# Patient Record
Sex: MALE | Race: WHITE | NOT HISPANIC OR LATINO | URBAN - METROPOLITAN AREA
[De-identification: names, ages, dates, MRNs, and addresses within clinical notes are randomized per-mention and may not be internally consistent; named-entity substitution may affect disease eponyms.]

---

## 2023-09-22 ENCOUNTER — HOSPITAL ENCOUNTER (OUTPATIENT)
Dept: DATA CONVERSION | Facility: HOSPITAL | Age: 58
Discharge: HOME | End: 2023-09-22
Payer: COMMERCIAL

## 2023-09-22 DIAGNOSIS — R94.5 ABNORMAL RESULTS OF LIVER FUNCTION STUDIES: ICD-10-CM

## 2023-09-22 LAB
HBV SURFACE AB SER QL IA: NORMAL
HBV SURFACE AG SER QL: NEGATIVE

## 2023-09-23 LAB
HBV CORE AB SER QL: NORMAL
HCV AB SER QL: NORMAL
HEPATITIS A IGG -LH SQ DATA CONVERSION: NORMAL

## 2023-10-05 ENCOUNTER — HOSPITAL ENCOUNTER (OUTPATIENT)
Dept: RADIOLOGY | Facility: HOSPITAL | Age: 58
Discharge: HOME | End: 2023-10-05
Payer: COMMERCIAL

## 2023-10-05 DIAGNOSIS — R94.5 ABNORMAL RESULTS OF LIVER FUNCTION STUDIES: ICD-10-CM

## 2023-10-05 PROCEDURE — 76705 ECHO EXAM OF ABDOMEN: CPT

## 2023-10-12 ENCOUNTER — TELEPHONE (OUTPATIENT)
Dept: PRIMARY CARE | Facility: CLINIC | Age: 58
End: 2023-10-12
Payer: COMMERCIAL

## 2023-10-12 DIAGNOSIS — E11.9 TYPE 2 DIABETES MELLITUS WITHOUT COMPLICATION, WITHOUT LONG-TERM CURRENT USE OF INSULIN (MULTI): ICD-10-CM

## 2023-10-12 DIAGNOSIS — E11.9 ALTERED METABOLISM DUE TO DIABETES (MULTI): Primary | ICD-10-CM

## 2023-11-08 PROBLEM — N52.9 MALE ERECTILE DYSFUNCTION, UNSPECIFIED: Status: ACTIVE | Noted: 2023-11-08

## 2023-11-08 PROBLEM — G47.33 OSA (OBSTRUCTIVE SLEEP APNEA): Status: ACTIVE | Noted: 2023-11-08

## 2023-11-08 PROBLEM — I51.9 HEART DISEASE, UNSPECIFIED: Status: ACTIVE | Noted: 2023-11-08

## 2023-11-08 PROBLEM — E03.9 HYPOTHYROIDISM (ACQUIRED): Status: ACTIVE | Noted: 2023-11-08

## 2023-11-08 PROBLEM — H52.4 MYOPIA WITH ASTIGMATISM AND PRESBYOPIA: Status: ACTIVE | Noted: 2023-11-08

## 2023-11-08 PROBLEM — S99.929A INJURY OF TOE: Status: ACTIVE | Noted: 2023-11-08

## 2023-11-08 PROBLEM — R22.42 FOOT MASS, LEFT: Status: ACTIVE | Noted: 2023-11-08

## 2023-11-08 PROBLEM — L89.892: Status: ACTIVE | Noted: 2023-11-08

## 2023-11-08 PROBLEM — H52.10 MYOPIA WITH ASTIGMATISM AND PRESBYOPIA: Status: ACTIVE | Noted: 2023-11-08

## 2023-11-08 PROBLEM — I70.90 ATHEROSCLEROSIS: Status: ACTIVE | Noted: 2023-11-08

## 2023-11-08 PROBLEM — N40.0 BENIGN PROSTATIC HYPERPLASIA: Status: ACTIVE | Noted: 2023-11-08

## 2023-11-08 PROBLEM — L89.893: Status: ACTIVE | Noted: 2023-11-08

## 2023-11-08 PROBLEM — H40.009 GLAUCOMA SUSPECT WITH OPEN ANGLE: Status: ACTIVE | Noted: 2023-11-08

## 2023-11-08 PROBLEM — H52.209 MYOPIA WITH ASTIGMATISM AND PRESBYOPIA: Status: ACTIVE | Noted: 2023-11-08

## 2023-11-08 PROBLEM — E11.9 TYPE 2 DIABETES MELLITUS WITHOUT COMPLICATIONS (MULTI): Status: ACTIVE | Noted: 2023-11-08

## 2023-11-08 PROBLEM — I10 ESSENTIAL HYPERTENSION: Status: ACTIVE | Noted: 2023-11-08

## 2023-11-08 PROBLEM — E78.2 MIXED HYPERLIPIDEMIA: Status: ACTIVE | Noted: 2023-11-08

## 2023-11-08 RX ORDER — METFORMIN HYDROCHLORIDE 1000 MG/1
1 TABLET ORAL
COMMUNITY
Start: 2017-08-04

## 2023-11-08 RX ORDER — FUROSEMIDE 20 MG/1
1 TABLET ORAL DAILY
COMMUNITY
Start: 2018-01-29

## 2023-11-08 RX ORDER — LEVOTHYROXINE SODIUM 175 UG/1
175 TABLET ORAL
COMMUNITY
Start: 2017-07-18

## 2023-11-08 RX ORDER — SIMVASTATIN 20 MG/1
TABLET, FILM COATED ORAL
COMMUNITY
Start: 2017-07-05 | End: 2023-12-07 | Stop reason: WASHOUT

## 2023-11-08 RX ORDER — INSULIN ASPART 100 [IU]/ML
25-30 INJECTION, SOLUTION INTRAVENOUS; SUBCUTANEOUS 3 TIMES DAILY
COMMUNITY
End: 2023-12-07 | Stop reason: WASHOUT

## 2023-11-08 RX ORDER — LISINOPRIL AND HYDROCHLOROTHIAZIDE 12.5; 2 MG/1; MG/1
1 TABLET ORAL DAILY
COMMUNITY
Start: 2022-11-03 | End: 2023-12-07 | Stop reason: SDUPTHER

## 2023-11-08 RX ORDER — PEN NEEDLE, DIABETIC 30 GX3/16"
NEEDLE, DISPOSABLE MISCELLANEOUS
COMMUNITY

## 2023-11-08 RX ORDER — LANCETS 33 GAUGE
1 EACH MISCELLANEOUS
COMMUNITY
Start: 2018-12-04

## 2023-11-08 RX ORDER — PYRIDOSTIGMINE BROMIDE 60 MG/1
60 TABLET ORAL 3 TIMES DAILY
COMMUNITY
End: 2023-12-07 | Stop reason: WASHOUT

## 2023-11-08 RX ORDER — TADALAFIL 20 MG/1
20 TABLET ORAL DAILY
COMMUNITY
Start: 2017-07-26

## 2023-11-08 RX ORDER — PEN NEEDLE, DIABETIC 29 G X1/2"
NEEDLE, DISPOSABLE MISCELLANEOUS
COMMUNITY
Start: 2023-08-19

## 2023-11-08 RX ORDER — BLOOD SUGAR DIAGNOSTIC
STRIP MISCELLANEOUS
COMMUNITY
Start: 2023-05-15

## 2023-11-08 RX ORDER — TELMISARTAN AND HYDROCHLORTHIAZIDE 40; 12.5 MG/1; MG/1
TABLET ORAL
COMMUNITY
End: 2023-12-07 | Stop reason: WASHOUT

## 2023-11-08 RX ORDER — SULFAMETHOXAZOLE AND TRIMETHOPRIM 800; 160 MG/1; MG/1
TABLET ORAL
COMMUNITY
Start: 2018-01-23 | End: 2023-12-07 | Stop reason: WASHOUT

## 2023-11-08 RX ORDER — TELMISARTAN AND HYDROCHLORTHIAZIDE 80; 12.5 MG/1; MG/1
TABLET ORAL
COMMUNITY
Start: 2017-07-05 | End: 2023-12-07 | Stop reason: WASHOUT

## 2023-11-08 RX ORDER — INSULIN ASPART 100 [IU]/ML
25-30 INJECTION, SOLUTION INTRAVENOUS; SUBCUTANEOUS 3 TIMES DAILY
COMMUNITY

## 2023-11-08 RX ORDER — TERAZOSIN 1 MG/1
1 CAPSULE ORAL NIGHTLY
COMMUNITY
Start: 2017-07-05

## 2023-11-08 RX ORDER — VENLAFAXINE HYDROCHLORIDE 37.5 MG/1
37.5 CAPSULE, EXTENDED RELEASE ORAL DAILY
COMMUNITY

## 2023-11-08 RX ORDER — PEN NEEDLE, DIABETIC 29 G X1/2"
NEEDLE, DISPOSABLE MISCELLANEOUS
COMMUNITY
Start: 2023-07-23 | End: 2023-12-07 | Stop reason: SDUPTHER

## 2023-11-08 RX ORDER — AMLODIPINE BESYLATE 10 MG/1
1 TABLET ORAL DAILY
COMMUNITY
Start: 2017-07-05

## 2023-11-09 ENCOUNTER — APPOINTMENT (OUTPATIENT)
Dept: PRIMARY CARE | Facility: CLINIC | Age: 58
End: 2023-11-09
Payer: COMMERCIAL

## 2023-11-25 LAB — HEMOGLOBIN A1C/HEMOGLOBIN TOTAL IN BLOOD EXTERNAL: 6.2 %

## 2023-11-28 ENCOUNTER — APPOINTMENT (OUTPATIENT)
Dept: PRIMARY CARE | Facility: CLINIC | Age: 58
End: 2023-11-28
Payer: COMMERCIAL

## 2023-12-07 ENCOUNTER — OFFICE VISIT (OUTPATIENT)
Dept: PRIMARY CARE | Facility: CLINIC | Age: 58
End: 2023-12-07
Payer: COMMERCIAL

## 2023-12-07 VITALS
SYSTOLIC BLOOD PRESSURE: 138 MMHG | HEART RATE: 76 BPM | BODY MASS INDEX: 34.46 KG/M2 | OXYGEN SATURATION: 99 % | HEIGHT: 76 IN | DIASTOLIC BLOOD PRESSURE: 70 MMHG | WEIGHT: 283 LBS

## 2023-12-07 DIAGNOSIS — E11.9 ALTERED METABOLISM DUE TO DIABETES (MULTI): Primary | ICD-10-CM

## 2023-12-07 DIAGNOSIS — E16.2 HYPOGLYCEMIA: ICD-10-CM

## 2023-12-07 DIAGNOSIS — Z23 NEED FOR VACCINATION: ICD-10-CM

## 2023-12-07 PROCEDURE — 3075F SYST BP GE 130 - 139MM HG: CPT | Performed by: FAMILY MEDICINE

## 2023-12-07 PROCEDURE — 1036F TOBACCO NON-USER: CPT | Performed by: FAMILY MEDICINE

## 2023-12-07 PROCEDURE — 90744 HEPB VACC 3 DOSE PED/ADOL IM: CPT | Performed by: FAMILY MEDICINE

## 2023-12-07 PROCEDURE — 3078F DIAST BP <80 MM HG: CPT | Performed by: FAMILY MEDICINE

## 2023-12-07 PROCEDURE — 99213 OFFICE O/P EST LOW 20 MIN: CPT | Performed by: FAMILY MEDICINE

## 2023-12-07 PROCEDURE — 3044F HG A1C LEVEL LT 7.0%: CPT | Performed by: FAMILY MEDICINE

## 2023-12-07 RX ORDER — PEN NEEDLE, DIABETIC 29 G X1/2"
NEEDLE, DISPOSABLE MISCELLANEOUS
Qty: 300 EACH | Refills: 3 | Status: SHIPPED | OUTPATIENT
Start: 2023-12-07

## 2023-12-07 RX ORDER — LISINOPRIL AND HYDROCHLOROTHIAZIDE 12.5; 2 MG/1; MG/1
1 TABLET ORAL DAILY
Qty: 90 TABLET | Refills: 3 | Status: SHIPPED | OUTPATIENT
Start: 2023-12-07 | End: 2024-04-01 | Stop reason: SDUPTHER

## 2023-12-07 ASSESSMENT — PAIN SCALES - GENERAL: PAINLEVEL: 0-NO PAIN

## 2023-12-07 ASSESSMENT — ENCOUNTER SYMPTOMS
POLYDIPSIA: 0
CHEST TIGHTNESS: 1

## 2023-12-07 NOTE — PROGRESS NOTES
"[facility]: [facility]  E/M EVALUATION    Leoncio Benitez is a 58 y.o. male who presents for Follow-up and Med Refill (Patient needs medication refills/dmw).    Subjective   DM- controlled.  Hba1c 6.2 10/23 [facility].  He was having lows in the 50s and had to decrease insulin for a while and has since increased back to his original.      Thyroid- needs to make sure its generic.      Had muscle biopsy, denervation noted. On effexor now.         Review of Systems   Respiratory:  Positive for chest tightness.    Cardiovascular:  Positive for chest pain.   Endocrine: Negative for polydipsia and polyuria.       Objective   Vitals:    12/07/23 1328   BP: 138/70   Pulse: 76   SpO2: 99%     Physical Exam  Constitutional:       Appearance: Normal appearance.   Cardiovascular:      Rate and Rhythm: Normal rate and regular rhythm.      Heart sounds: No murmur heard.  Pulmonary:      Effort: Pulmonary effort is normal.      Breath sounds: Normal breath sounds.   Neurological:      Mental Status: He is alert.       Cholesterol   Date Value Ref Range Status   04/28/2023 195 133 - 200 MG/DL Final     Triglycerides   Date Value Ref Range Status   04/28/2023 358 (H) 40 - 150 MG/DL Final     HDL   Date Value Ref Range Status   04/28/2023 27 (L) >40 MG/DL Final     Comment:     National Cholesterol Education Program(NCFP)guidelines:   <40 mg/dl:Low HDL-cholesterol(major risk factor for CHD)   >60 mg/dl:High HDL-cholesterol(\"negative\"risk factor for CHD)   HDL-cholesterol is affected by a number of factors,e.g.,smoking,  exercise,hormones,sex and age.       LDL Calculated   Date Value Ref Range Status   04/28/2023 96 65 - 130 MG/DL Final     Cholesterol/HDL Ratio   Date Value Ref Range Status   04/28/2023 7.2 RATIO Final     Comment:     According to the American Heart Association, the goal is to maintain   the total cholesterol/HDL ratio at 5-to-1 or lower with an optimum   ratio of 3.5-to-1.  Performed at [facility] " [address] [address]       Glucose   Date Value Ref Range Status   04/28/2023 64 (L) 65 - 99 MG/DL Final     Sodium   Date Value Ref Range Status   04/28/2023 139 133 - 145 MMOL/L Final     Potassium   Date Value Ref Range Status   04/28/2023 4.3 3.4 - 5.1 MMOL/L Final     ALT (SGPT)   Date Value Ref Range Status   04/28/2023 63 (H) 5 - 40 U/L Final     ESTIMATED GFR   Date Value Ref Range Status   07/11/2023 88 mL/min/1.73 m2 Final     Comment:     CALCULATIONS OF ESTIMATED GFR ARE PERFORMED USING THE 2021 CKD-EPI   STUDY REFIT EQUATION WITHOUT THE RACE VARIABLE FOR THE IDMS-TRACEABLE   CREATININE METHODS.  https://jasn.asnjournals.org/content/early/2021/09/22/ASN.7387190892  Performed at [address] [address]       Hemoglobin A1C   Date Value Ref Range Status   04/28/2023 6.8 (H) 4.0 - 6.0 % Final     Comment:     Hemoglobin A1C levels are related to mean blood glucose during the   preceding 2-3 months. The relationship table below may be used as a   general guide. Each 1% increase in HGB A1C is a reflection of an   increase in mean glucose of approximately 30 mg/dl.   Reference: Diabetes Care, volume 29, supplement 1 Jan. 2006                        HGB A1C ................. Approx. Mean Glucose   _______________________________________________   6%   ...............................  120 mg/dl   7%   ...............................  150 mg/dl   8%   ...............................  180 mg/dl   9%   ...............................  210 mg/dl   10%  ...............................  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  -     empagliflozin (Jardiance) 10 mg; Take 1 tablet (10 mg) by mouth once daily with a meal.  -     lisinopriL-hydrochlorothiazide 20-12.5 mg tablet; Take 1 tablet by mouth once daily.  -     BD Insulin Syringe Ultra-Fine 1 mL 31 gauge x 5/16 syringe; Use 3 per day with insulin.  Hypoglycemia  -     glucagon 1 mg/0.2 mL auto-injector; Inject 1 mg under the skin 1 time for 1 dose.  Need for vaccination  Other orders  -     Hepatitis B vaccine, 19 yrs and under (RECOMBIVAX, ENGERIX)  Hep B in 2 months, hep b in 6 months. Start hep a when available.     The patient was encouraged to ensure that any/all documentation is accurate and up to date, and that our office be provided a copy in the event that anything changes.         Be Womack MD

## 2024-02-12 ENCOUNTER — OFFICE VISIT (OUTPATIENT)
Dept: PRIMARY CARE | Facility: CLINIC | Age: 59
End: 2024-02-12
Payer: COMMERCIAL

## 2024-02-12 DIAGNOSIS — Z23 NEED FOR VACCINATION: Primary | ICD-10-CM

## 2024-02-12 PROCEDURE — 90632 HEPA VACCINE ADULT IM: CPT | Performed by: FAMILY MEDICINE

## 2024-02-12 PROCEDURE — 1036F TOBACCO NON-USER: CPT | Performed by: FAMILY MEDICINE

## 2024-02-12 PROCEDURE — 90472 IMMUNIZATION ADMIN EACH ADD: CPT | Performed by: FAMILY MEDICINE

## 2024-02-26 ENCOUNTER — OFFICE VISIT (OUTPATIENT)
Dept: PRIMARY CARE | Facility: CLINIC | Age: 59
End: 2024-02-26
Payer: COMMERCIAL

## 2024-02-26 VITALS
OXYGEN SATURATION: 97 % | DIASTOLIC BLOOD PRESSURE: 76 MMHG | WEIGHT: 287 LBS | HEIGHT: 76 IN | HEART RATE: 77 BPM | BODY MASS INDEX: 34.95 KG/M2 | SYSTOLIC BLOOD PRESSURE: 132 MMHG

## 2024-02-26 DIAGNOSIS — R30.0 DYSURIA: Primary | ICD-10-CM

## 2024-02-26 LAB
POC APPEARANCE, URINE: CLEAR
POC BILIRUBIN, URINE: NEGATIVE
POC BLOOD, URINE: NEGATIVE
POC COLOR, URINE: YELLOW
POC GLUCOSE, URINE: ABNORMAL MG/DL
POC KETONES, URINE: NEGATIVE MG/DL
POC LEUKOCYTES, URINE: NEGATIVE
POC NITRITE,URINE: NEGATIVE
POC PH, URINE: 5.5 PH
POC PROTEIN, URINE: NEGATIVE MG/DL
POC SPECIFIC GRAVITY, URINE: 1.02
POC UROBILINOGEN, URINE: 1 EU/DL

## 2024-02-26 PROCEDURE — 99213 OFFICE O/P EST LOW 20 MIN: CPT | Performed by: FAMILY MEDICINE

## 2024-02-26 PROCEDURE — 3078F DIAST BP <80 MM HG: CPT | Performed by: FAMILY MEDICINE

## 2024-02-26 PROCEDURE — 87086 URINE CULTURE/COLONY COUNT: CPT | Mod: WESLAB | Performed by: FAMILY MEDICINE

## 2024-02-26 PROCEDURE — 1036F TOBACCO NON-USER: CPT | Performed by: FAMILY MEDICINE

## 2024-02-26 PROCEDURE — 3075F SYST BP GE 130 - 139MM HG: CPT | Performed by: FAMILY MEDICINE

## 2024-02-26 PROCEDURE — 81003 URINALYSIS AUTO W/O SCOPE: CPT | Mod: 91 | Performed by: FAMILY MEDICINE

## 2024-02-26 ASSESSMENT — PATIENT HEALTH QUESTIONNAIRE - PHQ9
1. LITTLE INTEREST OR PLEASURE IN DOING THINGS: NOT AT ALL
SUM OF ALL RESPONSES TO PHQ9 QUESTIONS 1 AND 2: 0
2. FEELING DOWN, DEPRESSED OR HOPELESS: NOT AT ALL

## 2024-02-26 ASSESSMENT — ENCOUNTER SYMPTOMS
DIFFICULTY URINATING: 0
HEMATURIA: 0
FREQUENCY: 1
DYSURIA: 1

## 2024-02-26 ASSESSMENT — PAIN SCALES - GENERAL: PAINLEVEL: 5

## 2024-02-26 NOTE — PROGRESS NOTES
[facility]: [facility]  E/M EVALUATION    Leoncio Benitez is a 58 y.o. male who presents for uti (Patient having frequency, burning with urination a few weeks ago/dd).    Subjective   Burning and urgency with some  with some dribbling before and after urination. No hematuria.        Review of Systems   Genitourinary:  Positive for dysuria, frequency and urgency. Negative for difficulty urinating, hematuria and penile discharge.       Objective   Vitals:    02/26/24 1106   BP: 132/76   Pulse: 77   SpO2: 97%     Physical Exam  Constitutional:       Appearance: Normal appearance.   Neurological:      Mental Status: He is alert.             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 May need to see urology.      The patient was encouraged to ensure that any/all documentation is accurate and up to date, and that our office be provided a copy in the event that anything changes.         Be Womack MD

## 2024-02-27 LAB — BACTERIA UR CULT: NORMAL

## 2024-04-01 ENCOUNTER — TELEPHONE (OUTPATIENT)
Dept: PRIMARY CARE | Facility: CLINIC | Age: 59
End: 2024-04-01
Payer: COMMERCIAL

## 2024-04-01 DIAGNOSIS — E11.9 ALTERED METABOLISM DUE TO DIABETES (MULTI): ICD-10-CM

## 2024-04-01 RX ORDER — LISINOPRIL AND HYDROCHLOROTHIAZIDE 12.5; 2 MG/1; MG/1
1 TABLET ORAL 2 TIMES DAILY
Qty: 180 TABLET | Refills: 3 | Status: SHIPPED | OUTPATIENT
Start: 2024-04-01 | End: 2024-04-02 | Stop reason: SDUPTHER

## 2024-04-01 NOTE — TELEPHONE ENCOUNTER
Patient received his lisinopril prescription in the mail and the dosage is different than the last.    He was taking one pill twice a day , but the new bottle reads take one pill once a day. // he does not recall discussing any medication changes and would like some clarification on how he is supposed to be taking the medication

## 2024-04-02 RX ORDER — LISINOPRIL AND HYDROCHLOROTHIAZIDE 12.5; 2 MG/1; MG/1
1 TABLET ORAL 2 TIMES DAILY
Qty: 180 TABLET | Refills: 3 | Status: CANCELLED | OUTPATIENT
Start: 2024-04-02 | End: 2025-04-02

## 2024-04-02 RX ORDER — LISINOPRIL AND HYDROCHLOROTHIAZIDE 12.5; 2 MG/1; MG/1
1 TABLET ORAL 2 TIMES DAILY
Qty: 180 TABLET | Refills: 3 | Status: SHIPPED | OUTPATIENT
Start: 2024-04-02 | End: 2025-04-02

## 2024-04-02 NOTE — TELEPHONE ENCOUNTER
Spoke with the Pt he states the lisinopril-hydrochlorothiazide went to the wrong pharmacy,Pt no longer use [facility] will need to go to [facility] [place] [facility].New mail in pharmacy added.please resend.

## 2024-05-06 ENCOUNTER — APPOINTMENT (OUTPATIENT)
Dept: PRIMARY CARE | Facility: CLINIC | Age: 59
End: 2024-05-06
Payer: COMMERCIAL

## 2024-05-09 ENCOUNTER — OFFICE VISIT (OUTPATIENT)
Dept: PRIMARY CARE | Facility: CLINIC | Age: 59
End: 2024-05-09
Payer: COMMERCIAL

## 2024-05-09 DIAGNOSIS — Z23 NEED FOR VACCINATION: Primary | ICD-10-CM

## 2024-05-09 PROCEDURE — 90471 IMMUNIZATION ADMIN: CPT | Performed by: FAMILY MEDICINE

## 2024-05-09 PROCEDURE — 90632 HEPA VACCINE ADULT IM: CPT | Performed by: FAMILY MEDICINE

## 2024-05-09 PROCEDURE — 90743 HEPB VACC 2 DOSE ADOLESC IM: CPT | Performed by: FAMILY MEDICINE

## 2024-05-09 PROCEDURE — 90472 IMMUNIZATION ADMIN EACH ADD: CPT | Performed by: FAMILY MEDICINE

## 2024-06-12 ENCOUNTER — DOCUMENTATION (OUTPATIENT)
Dept: PRIMARY CARE | Facility: CLINIC | Age: 59
End: 2024-06-12
Payer: COMMERCIAL

## 2024-07-01 DIAGNOSIS — E11.9 ALTERED METABOLISM DUE TO DIABETES (MULTI): ICD-10-CM

## 2024-07-01 DIAGNOSIS — E16.2 HYPOGLYCEMIA: ICD-10-CM

## 2024-07-02 RX ORDER — PEN NEEDLE, DIABETIC 30 GX3/16"
1 NEEDLE, DISPOSABLE MISCELLANEOUS 3 TIMES DAILY
Qty: 300 EACH | Refills: 3 | Status: SHIPPED | OUTPATIENT
Start: 2024-07-02 | End: 2024-07-02 | Stop reason: ALTCHOICE

## 2024-07-02 RX ORDER — PEN NEEDLE, DIABETIC 30 GX3/16"
1 NEEDLE, DISPOSABLE MISCELLANEOUS 3 TIMES DAILY
Qty: 300 EACH | Refills: 3 | Status: SHIPPED | OUTPATIENT
Start: 2024-07-02 | End: 2025-07-02

## 2024-08-23 ENCOUNTER — TELEPHONE (OUTPATIENT)
Dept: PRIMARY CARE | Facility: CLINIC | Age: 59
End: 2024-08-23
Payer: COMMERCIAL

## 2024-08-26 DIAGNOSIS — Z12.5 SCREENING PSA (PROSTATE SPECIFIC ANTIGEN): ICD-10-CM

## 2024-08-26 DIAGNOSIS — E11.9 ALTERED METABOLISM DUE TO DIABETES (MULTI): Primary | ICD-10-CM

## 2024-09-05 DIAGNOSIS — E11.9 TYPE 2 DIABETES MELLITUS WITHOUT COMPLICATION, UNSPECIFIED WHETHER LONG TERM INSULIN USE (MULTI): Primary | ICD-10-CM

## 2024-09-08 RX ORDER — METFORMIN HYDROCHLORIDE 1000 MG/1
1000 TABLET ORAL
Qty: 180 TABLET | Refills: 3 | Status: SHIPPED | OUTPATIENT
Start: 2024-09-08

## 2024-09-26 ENCOUNTER — LAB (OUTPATIENT)
Dept: LAB | Facility: LAB | Age: 59
End: 2024-09-26
Payer: COMMERCIAL

## 2024-09-26 DIAGNOSIS — E11.9 ALTERED METABOLISM DUE TO DIABETES (MULTI): ICD-10-CM

## 2024-09-26 DIAGNOSIS — E11.9 TYPE 2 DIABETES MELLITUS WITHOUT COMPLICATION, WITHOUT LONG-TERM CURRENT USE OF INSULIN (MULTI): ICD-10-CM

## 2024-09-26 DIAGNOSIS — Z12.5 SCREENING PSA (PROSTATE SPECIFIC ANTIGEN): ICD-10-CM

## 2024-09-26 LAB
ALBUMIN SERPL BCP-MCNC: 4.1 G/DL (ref 3.4–5)
ALP SERPL-CCNC: 76 U/L (ref 33–120)
ALT SERPL W P-5'-P-CCNC: 46 U/L (ref 10–52)
ANION GAP SERPL CALCULATED.3IONS-SCNC: 14 MMOL/L (ref 10–20)
AST SERPL W P-5'-P-CCNC: 34 U/L (ref 9–39)
BILIRUB SERPL-MCNC: 0.5 MG/DL (ref 0–1.2)
BUN SERPL-MCNC: 17 MG/DL (ref 6–23)
CALCIUM SERPL-MCNC: 8.9 MG/DL (ref 8.6–10.3)
CHLORIDE SERPL-SCNC: 100 MMOL/L (ref 98–107)
CHOLEST SERPL-MCNC: 207 MG/DL (ref 0–199)
CHOLEST/HDLC SERPL: 7.1 {RATIO}
CO2 SERPL-SCNC: 27 MMOL/L (ref 21–32)
CREAT SERPL-MCNC: 0.95 MG/DL (ref 0.5–1.3)
CREAT UR-MCNC: 42.3 MG/DL (ref 20–370)
EGFRCR SERPLBLD CKD-EPI 2021: >90 ML/MIN/1.73M*2
EST. AVERAGE GLUCOSE BLD GHB EST-MCNC: 154 MG/DL
GLUCOSE SERPL-MCNC: 132 MG/DL (ref 74–99)
HBA1C MFR BLD: 7 %
HDLC SERPL-MCNC: 29.3 MG/DL
LDLC SERPL CALC-MCNC: ABNORMAL MG/DL
MICROALBUMIN UR-MCNC: <7 MG/L
MICROALBUMIN/CREAT UR: NORMAL MG/G{CREAT}
NON HDL CHOLESTEROL: 178 MG/DL (ref 0–149)
POTASSIUM SERPL-SCNC: 4.3 MMOL/L (ref 3.5–5.3)
PROT SERPL-MCNC: 6.5 G/DL (ref 6.4–8.2)
SODIUM SERPL-SCNC: 137 MMOL/L (ref 136–145)
TRIGL SERPL-MCNC: 629 MG/DL (ref 0–149)
VLDL: ABNORMAL

## 2024-09-26 PROCEDURE — 83036 HEMOGLOBIN GLYCOSYLATED A1C: CPT

## 2024-09-26 PROCEDURE — 80061 LIPID PANEL: CPT

## 2024-09-26 PROCEDURE — 82570 ASSAY OF URINE CREATININE: CPT

## 2024-09-26 PROCEDURE — 84153 ASSAY OF PSA TOTAL: CPT

## 2024-09-26 PROCEDURE — 82043 UR ALBUMIN QUANTITATIVE: CPT

## 2024-09-26 PROCEDURE — 36415 COLL VENOUS BLD VENIPUNCTURE: CPT

## 2024-09-26 PROCEDURE — 80053 COMPREHEN METABOLIC PANEL: CPT

## 2024-09-26 PROCEDURE — 82607 VITAMIN B-12: CPT

## 2024-09-27 LAB
PSA SERPL-MCNC: 0.52 NG/ML
VIT B12 SERPL-MCNC: 206 PG/ML (ref 211–911)

## 2024-09-30 DIAGNOSIS — L02.92 BOIL: Primary | ICD-10-CM

## 2024-09-30 RX ORDER — SULFAMETHOXAZOLE AND TRIMETHOPRIM 800; 160 MG/1; MG/1
1 TABLET ORAL 2 TIMES DAILY
Qty: 14 TABLET | Refills: 0 | Status: SHIPPED
Start: 2024-09-30 | End: 2024-10-01 | Stop reason: SDUPTHER

## 2024-10-01 RX ORDER — SULFAMETHOXAZOLE AND TRIMETHOPRIM 800; 160 MG/1; MG/1
1 TABLET ORAL 2 TIMES DAILY
Qty: 14 TABLET | Refills: 0 | Status: SHIPPED | OUTPATIENT
Start: 2024-10-01 | End: 2024-10-02 | Stop reason: ALTCHOICE

## 2024-10-02 RX ORDER — DOXYCYCLINE 100 MG/1
100 CAPSULE ORAL 2 TIMES DAILY
Qty: 14 CAPSULE | Refills: 0 | Status: SHIPPED | OUTPATIENT
Start: 2024-10-02 | End: 2024-10-09

## 2024-10-04 ENCOUNTER — OFFICE VISIT (OUTPATIENT)
Dept: PRIMARY CARE | Facility: CLINIC | Age: 59
End: 2024-10-04
Payer: COMMERCIAL

## 2024-10-04 VITALS
DIASTOLIC BLOOD PRESSURE: 70 MMHG | HEIGHT: 76 IN | BODY MASS INDEX: 34.22 KG/M2 | WEIGHT: 281 LBS | OXYGEN SATURATION: 97 % | SYSTOLIC BLOOD PRESSURE: 138 MMHG | HEART RATE: 86 BPM

## 2024-10-04 DIAGNOSIS — E11.9 ALTERED METABOLISM DUE TO DIABETES (MULTI): ICD-10-CM

## 2024-10-04 DIAGNOSIS — L02.91 ABSCESS: Primary | ICD-10-CM

## 2024-10-04 DIAGNOSIS — I10 PRIMARY HYPERTENSION: ICD-10-CM

## 2024-10-04 DIAGNOSIS — Z23 NEEDS FLU SHOT: ICD-10-CM

## 2024-10-04 DIAGNOSIS — E78.2 MIXED HYPERLIPIDEMIA: ICD-10-CM

## 2024-10-04 DIAGNOSIS — E53.8 B12 DEFICIENCY: ICD-10-CM

## 2024-10-04 PROCEDURE — 90656 IIV3 VACC NO PRSV 0.5 ML IM: CPT | Performed by: FAMILY MEDICINE

## 2024-10-04 PROCEDURE — 3062F POS MACROALBUMINURIA REV: CPT | Performed by: FAMILY MEDICINE

## 2024-10-04 PROCEDURE — 3008F BODY MASS INDEX DOCD: CPT | Performed by: FAMILY MEDICINE

## 2024-10-04 PROCEDURE — 90471 IMMUNIZATION ADMIN: CPT | Performed by: FAMILY MEDICINE

## 2024-10-04 PROCEDURE — 3078F DIAST BP <80 MM HG: CPT | Performed by: FAMILY MEDICINE

## 2024-10-04 PROCEDURE — 3075F SYST BP GE 130 - 139MM HG: CPT | Performed by: FAMILY MEDICINE

## 2024-10-04 PROCEDURE — 3051F HG A1C>EQUAL 7.0%<8.0%: CPT | Performed by: FAMILY MEDICINE

## 2024-10-04 PROCEDURE — 99214 OFFICE O/P EST MOD 30 MIN: CPT | Performed by: FAMILY MEDICINE

## 2024-10-04 RX ORDER — PREDNISONE 10 MG/1
5 TABLET ORAL
COMMUNITY
Start: 2024-08-15 | End: 2025-02-11

## 2024-10-04 ASSESSMENT — PATIENT HEALTH QUESTIONNAIRE - PHQ9
1. LITTLE INTEREST OR PLEASURE IN DOING THINGS: NOT AT ALL
2. FEELING DOWN, DEPRESSED OR HOPELESS: NOT AT ALL
SUM OF ALL RESPONSES TO PHQ9 QUESTIONS 1 AND 2: 0

## 2024-10-04 ASSESSMENT — PAIN SCALES - GENERAL: PAINLEVEL: 0-NO PAIN

## 2024-10-04 ASSESSMENT — ENCOUNTER SYMPTOMS
POLYDIPSIA: 0
WOUND: 1
SHORTNESS OF BREATH: 0

## 2024-10-04 NOTE — PROGRESS NOTES
[facility]: [facility]  E/M EVALUATION    Leoncio Benitez is a 58 y.o. male who presents for Follow-up and Med Refill (Patient needs novolin and novolog, would like to discuss switching due to insurance not covering these/dd).    Subjective   Pt here for follow up    DM- controlled.  Will need to change aspart and NPH.  Doesn't take MVI regularly.      Cyst/abscess left gluteal fold, no longer draining.      Htn- stable.  Tolerating current therapy.      Mixed hyperlipidemia- off statins due to muscular disorder.     Med Refill      Review of Systems   Respiratory:  Negative for shortness of breath.    Endocrine: Negative for polydipsia and polyuria.   Skin:  Positive for wound.       Objective   Vitals:    10/04/24 1039   BP: 138/70   Pulse: 86   SpO2: 97%     Physical Exam  Constitutional:       Appearance: Normal appearance.   Cardiovascular:      Rate and Rhythm: Normal rate and regular rhythm.      Heart sounds: No murmur heard.  Pulmonary:      Effort: Pulmonary effort is normal.      Breath sounds: Normal breath sounds.   Skin:     Comments: Left gluteal fold, small induration, no drainage.     Neurological:      Mental Status: He is alert.       Cholesterol   Date Value Ref Range Status   09/26/2024 207 (H) 0 - 199 mg/dL Final     Comment:           Age      Desirable   Borderline High   High     0-19 Y     0 - 169       170 - 199     >/= 200    20-24 Y     0 - 189       190 - 224     >/= 225         >24 Y     0 - 199       200 - 239     >/= 240   **All ranges are based on fasting samples. Specific   therapeutic targets will vary based on patient-specific   cardiac risk.    Pediatric guidelines reference:Pediatrics 2011, 128(S5).Adult guidelines reference: NCEP ATPIII Guidelines,WILLY 2001, 258:2486-97    Venipuncture immediately after or during the administration of Metamizole may lead to falsely low results. Testing should be performed immediately prior to Metamizole dosing.      Triglycerides   Date Value Ref Range Status   09/26/2024 629 (H) 0 - 149 mg/dL Final     Comment:        Age         Desirable   Borderline High   High     Very High   0 D-90 D    19 - 174         ----         ----        ----  91 D- 9 Y     0 -  74        75 -  99     >/= 100      ----    10-19 Y     0 -  89        90 - 129     >/= 130      ----    20-24 Y     0 - 114       115 - 149     >/= 150      ----         >24 Y     0 - 149       150 - 199    200- 499    >/= 500    Venipuncture immediately after or during the administration of Metamizole may lead to falsely low results. Testing should be performed immediately prior to Metamizole dosing.     HDL-Cholesterol   Date Value Ref Range Status   09/26/2024 29.3 mg/dL Final     Comment:       Age       Very Low   Low     Normal    High    0-19 Y    < 35      < 40     40-45     ----  20-24 Y    ----     < 40      >45      ----        >24 Y      ----     < 40     40-60      >60       LDL Calculated   Date Value Ref Range Status   09/26/2024   Final     Comment:     The calculation of LDL and VLDL are inaccurate when the Triglycerides are greater than 400 mg/dL or when the patient is non-fasting. If LDL measurement is necessary contact the testing laboratory for an alternative LDL assay.                                  Near   Borderline      AGE      Desirable  Optimal    High     High     Very High     0-19 Y     0 - 109     ---    110-129   >/= 130     ----    20-24 Y     0 - 119     ---    120-159   >/= 160     ----      >24 Y     0 -  99   100-129  130-159   160-189     >/=190       Cholesterol/HDL Ratio   Date Value Ref Range Status   09/26/2024 7.1  Final     Comment:       Ref Values  Desirable  < 3.4  High Risk  > 5.0     Glucose   Date Value Ref Range Status   09/26/2024 132 (H) 74 - 99 mg/dL Final     Sodium   Date Value Ref Range Status   09/26/2024 137 136 - 145 mmol/L Final     Potassium   Date Value Ref Range Status   09/26/2024 4.3 3.5 - 5.3 mmol/L  Final     ALT   Date Value Ref Range Status   09/26/2024 46 10 - 52 U/L Final     Comment:     Patients treated with Sulfasalazine may generate falsely decreased results for ALT.     eGFR   Date Value Ref Range Status   09/26/2024 >90 >60 mL/min/1.73m*2 Final     Comment:     Calculations of estimated GFR are performed using the 2021 CKD-EPI Study Refit equation without the race variable for the IDMS-Traceable creatinine methods.  https://jasn.asnjournals.org/content/early/2021/09/22/ASN.3454632011     Hemoglobin A1C External   Date Value Ref Range Status   11/24/2023 6.2 % Final     Hemoglobin A1C   Date Value Ref Range Status   09/26/2024 7.0 (H) See comment % Final     Thyroid Stimulating Hormone   Date Value Ref Range Status   05/24/2023 7.07 (H) 0.27 - 4.20 MIU/L Final     Comment:     Performed at [address] [address]       Assessment/Plan      Patient Active Problem List   Diagnosis    Atherosclerosis    Benign prostatic hyperplasia    Type 2 diabetes mellitus without complications (Multi)    Essential hypertension    Foot mass, left    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    Heart disease, unspecified    Hypothyroidism (acquired)    Injury of toe    Male erectile dysfunction, unspecified    Mixed hyperlipidemia    Myopia with astigmatism and presbyopia    SAUL (obstructive sleep apnea)    Pressure ulcer of left foot, stage 2 (Multi)    Pressure ulcer of toe of left foot, stage 3 (Multi)       Diagnoses and all orders for this visit:  Abscess  Altered metabolism due to diabetes (Multi)  B12 deficiency  Primary hypertension  Mixed hyperlipidemia  Needs flu shot  Other orders  -     Flu vaccine, trivalent, preservative free, age 6 months and greater (Fluarix/Fluzone/Flulaval)  Recommended statin, still holding off due to muscle disorder.  Recommended vascepa and diet change,  steroids may be effecting labs. So repeat 3 months.  Discussed insulin options.      The patient was encouraged to  ensure that any/all documentation is accurate and up to date, and that our office be provided a copy in the event that anything changes.         Be Womack MD

## 2024-10-08 DIAGNOSIS — E11.9 ALTERED METABOLISM DUE TO DIABETES (MULTI): Primary | ICD-10-CM

## 2024-10-15 DIAGNOSIS — E03.9 HYPOTHYROIDISM (ACQUIRED): Primary | ICD-10-CM

## 2024-10-15 RX ORDER — LEVOTHYROXINE SODIUM 175 UG/1
TABLET ORAL
Qty: 112 TABLET | Refills: 3 | Status: SHIPPED | OUTPATIENT
Start: 2024-10-15

## 2024-12-16 DIAGNOSIS — E11.9 ALTERED METABOLISM DUE TO DIABETES (MULTI): Primary | ICD-10-CM

## 2024-12-16 RX ORDER — NEEDLES, SAFETY 22GX1 1/2"
1 NEEDLE, DISPOSABLE MISCELLANEOUS 2 TIMES DAILY
Qty: 200 EACH | Refills: 3 | Status: SHIPPED | OUTPATIENT
Start: 2024-12-16 | End: 2025-12-16

## 2025-01-02 DIAGNOSIS — N40.0 BENIGN PROSTATIC HYPERPLASIA, UNSPECIFIED WHETHER LOWER URINARY TRACT SYMPTOMS PRESENT: ICD-10-CM

## 2025-01-02 DIAGNOSIS — I10 PRIMARY HYPERTENSION: Primary | ICD-10-CM

## 2025-01-02 DIAGNOSIS — E11.9 ALTERED METABOLISM DUE TO DIABETES (MULTI): ICD-10-CM

## 2025-01-02 RX ORDER — AMLODIPINE BESYLATE 10 MG/1
10 TABLET ORAL DAILY
Qty: 90 TABLET | Refills: 3 | Status: SHIPPED | OUTPATIENT
Start: 2025-01-02 | End: 2026-01-02

## 2025-01-02 RX ORDER — TERAZOSIN 1 MG/1
1 CAPSULE ORAL NIGHTLY
Qty: 90 CAPSULE | Refills: 3 | Status: SHIPPED | OUTPATIENT
Start: 2025-01-02 | End: 2026-01-02

## 2025-01-27 DIAGNOSIS — J01.01 ACUTE RECURRENT MAXILLARY SINUSITIS: Primary | ICD-10-CM

## 2025-01-27 RX ORDER — AMOXICILLIN AND CLAVULANATE POTASSIUM 875; 125 MG/1; MG/1
875 TABLET, FILM COATED ORAL 2 TIMES DAILY
Qty: 20 TABLET | Refills: 0 | Status: SHIPPED | OUTPATIENT
Start: 2025-01-27 | End: 2025-02-06

## 2025-04-11 ENCOUNTER — OFFICE VISIT (OUTPATIENT)
Dept: PRIMARY CARE | Facility: CLINIC | Age: 60
End: 2025-04-11
Payer: COMMERCIAL

## 2025-04-11 VITALS
SYSTOLIC BLOOD PRESSURE: 136 MMHG | HEART RATE: 85 BPM | OXYGEN SATURATION: 95 % | HEIGHT: 76 IN | BODY MASS INDEX: 35.92 KG/M2 | WEIGHT: 295 LBS | DIASTOLIC BLOOD PRESSURE: 76 MMHG

## 2025-04-11 DIAGNOSIS — E11.9 ALTERED METABOLISM DUE TO DIABETES (MULTI): ICD-10-CM

## 2025-04-11 DIAGNOSIS — Z13.6 ENCOUNTER FOR SCREENING FOR CARDIOVASCULAR DISORDERS: ICD-10-CM

## 2025-04-11 DIAGNOSIS — N40.0 BENIGN PROSTATIC HYPERPLASIA, UNSPECIFIED WHETHER LOWER URINARY TRACT SYMPTOMS PRESENT: ICD-10-CM

## 2025-04-11 DIAGNOSIS — K21.9 GASTROESOPHAGEAL REFLUX DISEASE WITHOUT ESOPHAGITIS: ICD-10-CM

## 2025-04-11 DIAGNOSIS — Z11.59 SCREENING FOR VIRAL DISEASE: ICD-10-CM

## 2025-04-11 DIAGNOSIS — N52.01 ERECTILE DYSFUNCTION DUE TO ARTERIAL INSUFFICIENCY: ICD-10-CM

## 2025-04-11 DIAGNOSIS — E03.9 HYPOTHYROIDISM (ACQUIRED): ICD-10-CM

## 2025-04-11 DIAGNOSIS — G70.01 MYASTHENIA GRAVIS WITH EXACERBATION (MULTI): ICD-10-CM

## 2025-04-11 DIAGNOSIS — E11.9 TYPE 2 DIABETES MELLITUS WITHOUT COMPLICATION, UNSPECIFIED WHETHER LONG TERM INSULIN USE: ICD-10-CM

## 2025-04-11 DIAGNOSIS — I10 PRIMARY HYPERTENSION: ICD-10-CM

## 2025-04-11 DIAGNOSIS — E16.2 HYPOGLYCEMIA: ICD-10-CM

## 2025-04-11 DIAGNOSIS — Z13.29 SCREENING FOR THYROID DISORDER: ICD-10-CM

## 2025-04-11 DIAGNOSIS — E55.9 VITAMIN D DEFICIENCY: ICD-10-CM

## 2025-04-11 DIAGNOSIS — Z12.5 SCREENING FOR MALIGNANT NEOPLASM OF PROSTATE: ICD-10-CM

## 2025-04-11 DIAGNOSIS — Z00.00 WELLNESS EXAMINATION: Primary | ICD-10-CM

## 2025-04-11 DIAGNOSIS — R79.9 ABNORMAL FINDING OF BLOOD CHEMISTRY, UNSPECIFIED: ICD-10-CM

## 2025-04-11 DIAGNOSIS — Z13.1 SCREENING FOR DIABETES MELLITUS: ICD-10-CM

## 2025-04-11 PROBLEM — Z86.79 HISTORY OF HYPERTENSION: Status: ACTIVE | Noted: 2025-04-11

## 2025-04-11 PROBLEM — E66.811 OBESITY, CLASS I, BMI 30-34.9: Status: ACTIVE | Noted: 2023-11-30

## 2025-04-11 PROBLEM — M47.816 SPONDYLOSIS OF LUMBAR SPINE: Status: ACTIVE | Noted: 2025-04-11

## 2025-04-11 PROBLEM — K59.01 SLOW TRANSIT CONSTIPATION: Status: ACTIVE | Noted: 2025-04-11

## 2025-04-11 PROBLEM — Z86.39 HISTORY OF HYPOTHYROIDISM: Status: ACTIVE | Noted: 2025-04-11

## 2025-04-11 PROBLEM — L97.509 ULCER OF TOE (MULTI): Status: ACTIVE | Noted: 2025-04-11

## 2025-04-11 PROBLEM — Z86.39 HISTORY OF DIABETES MELLITUS: Status: ACTIVE | Noted: 2025-04-11

## 2025-04-11 RX ORDER — AZATHIOPRINE 50 MG/1
150 TABLET ORAL DAILY
COMMUNITY

## 2025-04-11 RX ORDER — LANCETS 33 GAUGE
EACH MISCELLANEOUS
Qty: 300 EACH | Refills: 3 | Status: SHIPPED | OUTPATIENT
Start: 2025-04-11

## 2025-04-11 RX ORDER — METFORMIN HYDROCHLORIDE 1000 MG/1
1000 TABLET ORAL
Qty: 180 TABLET | Refills: 0 | Status: SHIPPED | OUTPATIENT
Start: 2025-04-11

## 2025-04-11 RX ORDER — TERAZOSIN 1 MG/1
1 CAPSULE ORAL NIGHTLY
Qty: 90 CAPSULE | Refills: 3 | Status: SHIPPED | OUTPATIENT
Start: 2025-04-11 | End: 2026-04-11

## 2025-04-11 RX ORDER — AMLODIPINE BESYLATE 10 MG/1
10 TABLET ORAL DAILY
Qty: 90 TABLET | Refills: 3 | Status: SHIPPED | OUTPATIENT
Start: 2025-04-11 | End: 2026-04-11

## 2025-04-11 RX ORDER — OMEPRAZOLE 20 MG/1
20 TABLET, DELAYED RELEASE ORAL
COMMUNITY
End: 2025-04-11 | Stop reason: SDUPTHER

## 2025-04-11 RX ORDER — OMEPRAZOLE 20 MG/1
20 TABLET, DELAYED RELEASE ORAL
Qty: 90 TABLET | Refills: 0 | Status: SHIPPED | OUTPATIENT
Start: 2025-04-11 | End: 2025-07-10

## 2025-04-11 RX ORDER — LEVOTHYROXINE SODIUM 175 UG/1
175 TABLET ORAL DAILY
Qty: 90 TABLET | Refills: 3 | Status: SHIPPED | OUTPATIENT
Start: 2025-04-11 | End: 2026-04-11

## 2025-04-11 RX ORDER — DULOXETIN HYDROCHLORIDE 30 MG/1
30 CAPSULE, DELAYED RELEASE ORAL DAILY
Qty: 90 CAPSULE | Refills: 0 | Status: SHIPPED | OUTPATIENT
Start: 2025-04-11 | End: 2025-07-10

## 2025-04-11 RX ORDER — ALBUTEROL SULFATE 90 UG/1
2 INHALANT RESPIRATORY (INHALATION) EVERY 4 HOURS PRN
Qty: 6.7 G | Refills: 11 | Status: SHIPPED | OUTPATIENT
Start: 2025-04-11 | End: 2026-04-11

## 2025-04-11 RX ORDER — PREDNISONE 20 MG/1
20 TABLET ORAL DAILY
COMMUNITY

## 2025-04-11 RX ORDER — SILDENAFIL 100 MG/1
100 TABLET, FILM COATED ORAL DAILY PRN
Qty: 30 TABLET | Refills: 11 | Status: SHIPPED | OUTPATIENT
Start: 2025-04-11 | End: 2026-04-11

## 2025-04-11 RX ORDER — LISINOPRIL AND HYDROCHLOROTHIAZIDE 12.5; 2 MG/1; MG/1
1 TABLET ORAL 2 TIMES DAILY
Qty: 180 TABLET | Refills: 3 | Status: SHIPPED | OUTPATIENT
Start: 2025-04-11 | End: 2026-04-11

## 2025-04-11 RX ORDER — BLOOD SUGAR DIAGNOSTIC
1 STRIP MISCELLANEOUS 3 TIMES DAILY
Qty: 300 EACH | Refills: 3 | Status: SHIPPED | OUTPATIENT
Start: 2025-04-11 | End: 2026-04-11

## 2025-04-11 RX ORDER — NEEDLES, SAFETY 22GX1 1/2"
1 NEEDLE, DISPOSABLE MISCELLANEOUS 2 TIMES DAILY
Qty: 200 EACH | Refills: 3 | Status: SHIPPED | OUTPATIENT
Start: 2025-04-11 | End: 2026-04-11

## 2025-04-11 RX ORDER — DULOXETIN HYDROCHLORIDE 30 MG/1
30 CAPSULE, DELAYED RELEASE ORAL DAILY
COMMUNITY
End: 2025-04-11 | Stop reason: SDUPTHER

## 2025-04-11 ASSESSMENT — COLUMBIA-SUICIDE SEVERITY RATING SCALE - C-SSRS
2. HAVE YOU ACTUALLY HAD ANY THOUGHTS OF KILLING YOURSELF?: NO
6. HAVE YOU EVER DONE ANYTHING, STARTED TO DO ANYTHING, OR PREPARED TO DO ANYTHING TO END YOUR LIFE?: NO
1. IN THE PAST MONTH, HAVE YOU WISHED YOU WERE DEAD OR WISHED YOU COULD GO TO SLEEP AND NOT WAKE UP?: NO

## 2025-04-11 ASSESSMENT — ENCOUNTER SYMPTOMS
OCCASIONAL FEELINGS OF UNSTEADINESS: 0
DEPRESSION: 0
LOSS OF SENSATION IN FEET: 1

## 2025-04-11 ASSESSMENT — PATIENT HEALTH QUESTIONNAIRE - PHQ9
2. FEELING DOWN, DEPRESSED OR HOPELESS: NOT AT ALL
SUM OF ALL RESPONSES TO PHQ9 QUESTIONS 1 AND 2: 0
1. LITTLE INTEREST OR PLEASURE IN DOING THINGS: NOT AT ALL

## 2025-04-11 ASSESSMENT — PAIN SCALES - GENERAL: PAINLEVEL_OUTOF10: 5

## 2025-04-11 NOTE — PROGRESS NOTES
59-year presents to establish care for yearly physical follow chronic medical conditions    Health Maintenance:  Eats a standard American diet.  Gets minimal exercise.  Does not drink, smoke, use illicit substances.  Otherwise up-to-date on all routine health maintenance screenings.  Due for immunizations.  Due for yearly lab work.    1. Wellness examination    2. Screening for thyroid disorder    3. Abnormal finding of blood chemistry, unspecified    4. Screening for diabetes mellitus    5. Screening for viral disease    6. Encounter for screening for cardiovascular disorders    7. Screening for malignant neoplasm of prostate    8. Altered metabolism due to diabetes (Multi)   Last A1c close to goal currently on long-acting insulin not on CGM has never attempted GLP not following with endocrinology   9. Primary hypertension   Blood pressure 136/76   10. Hypothyroidism (acquired)   On levothyroxine needs TSH rechecked   11. Benign prostatic hyperplasia, unspecified whether lower urinary tract symptoms present   Symptoms well-controlled on current therapy   12. Type 2 diabetes mellitus without complication, unspecified whether long term insulin use    13. Hypoglycemia    14. Erectile dysfunction due to arterial insufficiency   Currently utilizes 20 mg Cialis as needed not working well   15. Gastroesophageal reflux disease without esophagitis   Well-controlled on current therapy   16. Myasthenia gravis with exacerbation (Multi)   Utilizes 30 mg Cymbalta currently following with a specialist for this trialing azathioprine   17. Vitamin D deficiency        All pertinent positive symptoms are included in history of present illness.    All other systems have been reviewed and are negative and noncontributory to this patient's current ailments.      CONSTITUTIONAL - INAD. Not ill appearing.  SKIN - No lesions or rashes visualized. Good skin turgor.  HEENT- Head is atraumatic and normocephalic. Nasal turbinates are  nonerythematous and without drainage. .  RESP - CTAB. No wheezing, rhonchi, or crackles.   CARDIAC - RRR. No murmurs, gallops, or rubs.  ABDOMEN - Soft, nontender, nondistended. No organomegaly.  NEURO - CNs 2-12 grossly intact.  PSYCH - Normal mood and affect      1. Wellness examination (Primary)  Health and wellness topics discussed today.  Recommended eating a varied and healthy diet and made dietary recommendations, also discussed exercise and exercising regularly 30 minutes a day 3 days a week.  Immunizations recommended and updated.  Health screening guidelines discussed with patient including possible things such as colonoscopies, mammograms, prostate screenings, lung cancer screenings, bone densitometry, and other wellness topics.  Yearly lab work ordered or reviewed today.  - CBC and Auto Differential; Future  - Comprehensive Metabolic Panel; Future  - Lipid Panel; Future  - Hemoglobin A1C; Future  - TSH with reflex to Free T4 if abnormal; Future  - Vitamin D 25-Hydroxy,Total (for eval of Vitamin D levels); Future  - Prostate Specific Antigen, Screen; Future  - CBC and Auto Differential  - Comprehensive Metabolic Panel  - Lipid Panel  - Hemoglobin A1C  - TSH with reflex to Free T4 if abnormal  - Vitamin D 25-Hydroxy,Total (for eval of Vitamin D levels)  - Prostate Specific Antigen, Screen  - HIV 1/2 Antigen/Antibody Screen with Reflex to Confirmation; Future  - HIV 1/2 Antigen/Antibody Screen with Reflex to Confirmation    2. Screening for thyroid disorder    - CBC and Auto Differential; Future  - Comprehensive Metabolic Panel; Future  - Lipid Panel; Future  - Hemoglobin A1C; Future  - TSH with reflex to Free T4 if abnormal; Future  - Vitamin D 25-Hydroxy,Total (for eval of Vitamin D levels); Future  - Prostate Specific Antigen, Screen; Future  - CBC and Auto Differential  - Comprehensive Metabolic Panel  - Lipid Panel  - Hemoglobin A1C  - TSH with reflex to Free T4 if abnormal  - Vitamin D  25-Hydroxy,Total (for eval of Vitamin D levels)  - Prostate Specific Antigen, Screen  - HIV 1/2 Antigen/Antibody Screen with Reflex to Confirmation; Future  - HIV 1/2 Antigen/Antibody Screen with Reflex to Confirmation    3. Abnormal finding of blood chemistry, unspecified    - CBC and Auto Differential; Future  - Comprehensive Metabolic Panel; Future  - Lipid Panel; Future  - Hemoglobin A1C; Future  - TSH with reflex to Free T4 if abnormal; Future  - Vitamin D 25-Hydroxy,Total (for eval of Vitamin D levels); Future  - Prostate Specific Antigen, Screen; Future  - CBC and Auto Differential  - Comprehensive Metabolic Panel  - Lipid Panel  - Hemoglobin A1C  - TSH with reflex to Free T4 if abnormal  - Vitamin D 25-Hydroxy,Total (for eval of Vitamin D levels)  - Prostate Specific Antigen, Screen  - HIV 1/2 Antigen/Antibody Screen with Reflex to Confirmation; Future  - HIV 1/2 Antigen/Antibody Screen with Reflex to Confirmation    4. Screening for diabetes mellitus    - CBC and Auto Differential; Future  - Comprehensive Metabolic Panel; Future  - Lipid Panel; Future  - Hemoglobin A1C; Future  - TSH with reflex to Free T4 if abnormal; Future  - Vitamin D 25-Hydroxy,Total (for eval of Vitamin D levels); Future  - Prostate Specific Antigen, Screen; Future  - CBC and Auto Differential  - Comprehensive Metabolic Panel  - Lipid Panel  - Hemoglobin A1C  - TSH with reflex to Free T4 if abnormal  - Vitamin D 25-Hydroxy,Total (for eval of Vitamin D levels)  - Prostate Specific Antigen, Screen  - HIV 1/2 Antigen/Antibody Screen with Reflex to Confirmation; Future  - HIV 1/2 Antigen/Antibody Screen with Reflex to Confirmation    5. Screening for viral disease    - CBC and Auto Differential; Future  - Comprehensive Metabolic Panel; Future  - Lipid Panel; Future  - Hemoglobin A1C; Future  - TSH with reflex to Free T4 if abnormal; Future  - Vitamin D 25-Hydroxy,Total (for eval of Vitamin D levels); Future  - Prostate Specific Antigen,  Screen; Future  - CBC and Auto Differential  - Comprehensive Metabolic Panel  - Lipid Panel  - Hemoglobin A1C  - TSH with reflex to Free T4 if abnormal  - Vitamin D 25-Hydroxy,Total (for eval of Vitamin D levels)  - Prostate Specific Antigen, Screen  - HIV 1/2 Antigen/Antibody Screen with Reflex to Confirmation; Future  - HIV 1/2 Antigen/Antibody Screen with Reflex to Confirmation  - Measles (Rubeola) Antibody, IgG; Future  - Measles (Rubeola) Antibody, IgG    6. Encounter for screening for cardiovascular disorders    - CBC and Auto Differential; Future  - Comprehensive Metabolic Panel; Future  - Lipid Panel; Future  - Hemoglobin A1C; Future  - TSH with reflex to Free T4 if abnormal; Future  - Vitamin D 25-Hydroxy,Total (for eval of Vitamin D levels); Future  - Prostate Specific Antigen, Screen; Future  - CBC and Auto Differential  - Comprehensive Metabolic Panel  - Lipid Panel  - Hemoglobin A1C  - TSH with reflex to Free T4 if abnormal  - Vitamin D 25-Hydroxy,Total (for eval of Vitamin D levels)  - Prostate Specific Antigen, Screen  - HIV 1/2 Antigen/Antibody Screen with Reflex to Confirmation; Future  - HIV 1/2 Antigen/Antibody Screen with Reflex to Confirmation    7. Screening for malignant neoplasm of prostate    - CBC and Auto Differential; Future  - Comprehensive Metabolic Panel; Future  - Lipid Panel; Future  - Hemoglobin A1C; Future  - TSH with reflex to Free T4 if abnormal; Future  - Vitamin D 25-Hydroxy,Total (for eval of Vitamin D levels); Future  - Prostate Specific Antigen, Screen; Future  - CBC and Auto Differential  - Comprehensive Metabolic Panel  - Lipid Panel  - Hemoglobin A1C  - TSH with reflex to Free T4 if abnormal  - Vitamin D 25-Hydroxy,Total (for eval of Vitamin D levels)  - Prostate Specific Antigen, Screen  - HIV 1/2 Antigen/Antibody Screen with Reflex to Confirmation; Future  - HIV 1/2 Antigen/Antibody Screen with Reflex to Confirmation    8. Altered metabolism due to diabetes  (Multi)  Spoke extensively about the natural course and history of type 2 diabetes.    Spoke about how glucose in the blood causes blood vessel wall damage and can lead to end-organ damage and how it can damage the blood vessels that supply the nerves causing polyneuropathy, that supply the kidneys causing kidney failure, that supply the heart causing heart attacks, and that supply the brain causing strokes.  Spoke about the need to lower blood sugar is much as possible by interventions such as limiting carbohydrate intake, exercising, as well as using medications to remove glucose from the blood.    Discussion was had about the role of medications as well as lifestyle interventions and, my personal recommendation is an intensive lifestyle intervention including dietary and exercise programs to help reduce the need for medications in the future.  My personal recommendation for all diabetics is to undertake a very low carbohydrate diet such as a ketogenic diet and this may have been discussed with the patient if appropriate.    Conversation may have included information about medications such as metformin, SGLT2 inhibitors, DPP 4 inhibitors, GLP-1 agonitsts, sulfonylureas, and insulin, the side effects and expected benefits of each recommended medication, and the recommended monitoring for each.    Recommendations made to the patient included optimal medical therapy including recommending an ACE/ARB for renal protection as well as statin therapy for prevention of heart attacks and strokes.  Blood pressure goal of 130/80.  Recommend patient follow-up every 3 months for A1c checks and appropriate examinations as well as treatment protocol adjustments.   Appropriate lab work was ordered today.  - blood sugar diagnostic (Accu-Chek Guide test strips); Inject 1 each under the skin 3 times a day.  strip before each meal and at bedtime for 90 days  Dispense: 300 each; Refill: 3  - insulin syringe,safety needle (BD  "SafetyGlide Insulin Syringe) 0.3 mL 31 gauge x 5/16\" syringe; 1 Syringe 2 times a day. Use to inject 2 times daily as directed.  Dispense: 200 each; Refill: 3  - lancets (Micro Thin Lancets) 33 gauge misc; before each meal and at bedtime for 90 days  Dispense: 300 each; Refill: 3  - empagliflozin (Jardiance) 10 mg tablet; Take 1 tablet (10 mg) by mouth once daily with breakfast.  Dispense: 90 tablet; Refill: 3  - lisinopriL-hydrochlorothiazide 20-12.5 mg tablet; Take 1 tablet by mouth 2 times a day.  Dispense: 180 tablet; Refill: 3  - Referral to Endocrinology; Future  - Referral to Clinical Pharmacy; Future  - insulin NPH, Isophane, (HumuLIN N,NovoLIN N) 100 unit/mL injection; Inject 110 Units under the skin 2 times a day before meals. Take as directed per insulin instructions.  Dispense: 198 mL; Refill: 0  - insulin lispro (HumaLOG) 100 unit/mL injection; Inject 25-30 Units under the skin 3 times daily (morning, midday, late afternoon). Take as directed per insulin instructions.  Dispense: 81 mL; Refill: 3  - glucagon 1 mg/0.2 mL auto-injector; Inject 1 mg under the skin 1 time if needed (BG <65) for up to 1 dose.  Dispense: 0.2 mL; Refill: 1  - CBC and Auto Differential; Future  - Comprehensive Metabolic Panel; Future  - Lipid Panel; Future  - Hemoglobin A1C; Future  - TSH with reflex to Free T4 if abnormal; Future  - Vitamin D 25-Hydroxy,Total (for eval of Vitamin D levels); Future  - Prostate Specific Antigen, Screen; Future  - CBC and Auto Differential  - Comprehensive Metabolic Panel  - Lipid Panel  - Hemoglobin A1C  - TSH with reflex to Free T4 if abnormal  - Vitamin D 25-Hydroxy,Total (for eval of Vitamin D levels)  - Prostate Specific Antigen, Screen  - HIV 1/2 Antigen/Antibody Screen with Reflex to Confirmation; Future  - HIV 1/2 Antigen/Antibody Screen with Reflex to Confirmation    9. Primary hypertension  Had a discussion with the patient about hypertension.  Discussed the natural history and course of " hypertension and need for controlling blood pressure.  Discussed the cost benefit of treating hypertension with medication and how lowering blood pressure to goal levels will help reduce the risk of heart attacks and strokes in the future.     Discussed lifestyle interventions including regular cardio aerobic exercise 30 minutes daily at least 3 times a week, hopefully more.  Discussed dietary interventions to help lower blood pressure.    Recommend patient get a blood pressure cuff and begin measuring blood pressure at home and keeping a log.  Advised the patient bring the log with them at their next visit so that we can find out the average blood pressure reading and determine whether or not the treatment is working.    If patient is already taking medication, I recommended continuing or changing medication depending on blood pressure control.    Appropriate lab work was ordered.  - amLODIPine (Norvasc) 10 mg tablet; Take 1 tablet (10 mg) by mouth once daily. For 90 days  Dispense: 90 tablet; Refill: 3  - CBC and Auto Differential; Future  - Comprehensive Metabolic Panel; Future  - Lipid Panel; Future  - Hemoglobin A1C; Future  - TSH with reflex to Free T4 if abnormal; Future  - Vitamin D 25-Hydroxy,Total (for eval of Vitamin D levels); Future  - Prostate Specific Antigen, Screen; Future  - CBC and Auto Differential  - Comprehensive Metabolic Panel  - Lipid Panel  - Hemoglobin A1C  - TSH with reflex to Free T4 if abnormal  - Vitamin D 25-Hydroxy,Total (for eval of Vitamin D levels)  - Prostate Specific Antigen, Screen  - HIV 1/2 Antigen/Antibody Screen with Reflex to Confirmation; Future  - HIV 1/2 Antigen/Antibody Screen with Reflex to Confirmation    10. Hypothyroidism (acquired)  The natural history and course of hypothyroidism was discussed at length with the patient.  Spoke with a different cause of hypothyroidism including acquired as well as idiopathic hypothyroidism.  Spoke about different symptoms look  out for for hyper or hypothyroid symptoms including weight loss or gain, heat or cold intolerance, or sensations of palpitations/fatigue.  Spoke with the different treatment options with regard to hypothyroidism including levothyroxine.  If discussed, we spoke about how I do not like to use alternative thyroid medications such as Cytomel secondary to difficulty in dosing.  Spoke with the need to routinely monitor TSH and the patient to assess efficacy of treatment.  I would like to see the patient every 6 months to recheck TSH for medication adjustment.  Routine lab work was ordered today, medication was ordered and/or adjusted today.  - levothyroxine (Synthroid, Levoxyl) 175 mcg tablet; Take 1 tablet (175 mcg) by mouth early in the morning.. Take on an empty stomach at the same time each day, either 30 to 60 minutes prior to breakfast  Dispense: 90 tablet; Refill: 3  - CBC and Auto Differential; Future  - Comprehensive Metabolic Panel; Future  - Lipid Panel; Future  - Hemoglobin A1C; Future  - TSH with reflex to Free T4 if abnormal; Future  - Vitamin D 25-Hydroxy,Total (for eval of Vitamin D levels); Future  - Prostate Specific Antigen, Screen; Future  - CBC and Auto Differential  - Comprehensive Metabolic Panel  - Lipid Panel  - Hemoglobin A1C  - TSH with reflex to Free T4 if abnormal  - Vitamin D 25-Hydroxy,Total (for eval of Vitamin D levels)  - Prostate Specific Antigen, Screen  - HIV 1/2 Antigen/Antibody Screen with Reflex to Confirmation; Future  - HIV 1/2 Antigen/Antibody Screen with Reflex to Confirmation    11. Benign prostatic hyperplasia, unspecified whether lower urinary tract symptoms present  The natural history and course of benign prostatic hyperplasia was discussed at length with the patient.  Informed patient that BPH typically presents with urinary complaints including such things as postvoid dribbling, urinary hesitancy, incomplete bladder emptying, nocturia, and others.  Spoke with the  different treatment options for patient including watchful waiting, medications such as Flomax or Proscar, as well as referral to urology for procedural intervention such as TURP.     Routine lab work was ordered/reviewed including urinalysis and PSA.    Patient made an informed decision about 1 of these treatment choices at this time.    - terazosin (Hytrin) 1 mg capsule; Take 1 capsule (1 mg) by mouth once daily at bedtime.  Dispense: 90 capsule; Refill: 3  - CBC and Auto Differential; Future  - Comprehensive Metabolic Panel; Future  - Lipid Panel; Future  - Hemoglobin A1C; Future  - TSH with reflex to Free T4 if abnormal; Future  - Vitamin D 25-Hydroxy,Total (for eval of Vitamin D levels); Future  - Prostate Specific Antigen, Screen; Future  - CBC and Auto Differential  - Comprehensive Metabolic Panel  - Lipid Panel  - Hemoglobin A1C  - TSH with reflex to Free T4 if abnormal  - Vitamin D 25-Hydroxy,Total (for eval of Vitamin D levels)  - Prostate Specific Antigen, Screen  - HIV 1/2 Antigen/Antibody Screen with Reflex to Confirmation; Future  - HIV 1/2 Antigen/Antibody Screen with Reflex to Confirmation    12. Type 2 diabetes mellitus without complication, unspecified whether long term insulin use    - metFORMIN (Glucophage) 1,000 mg tablet; Take 1 tablet (1,000 mg) by mouth 2 times daily (morning and late afternoon).  Dispense: 180 tablet; Refill: 0  - CBC and Auto Differential; Future  - Comprehensive Metabolic Panel; Future  - Lipid Panel; Future  - Hemoglobin A1C; Future  - TSH with reflex to Free T4 if abnormal; Future  - Vitamin D 25-Hydroxy,Total (for eval of Vitamin D levels); Future  - Prostate Specific Antigen, Screen; Future  - CBC and Auto Differential  - Comprehensive Metabolic Panel  - Lipid Panel  - Hemoglobin A1C  - TSH with reflex to Free T4 if abnormal  - Vitamin D 25-Hydroxy,Total (for eval of Vitamin D levels)  - Prostate Specific Antigen, Screen  - HIV 1/2 Antigen/Antibody Screen with Reflex  to Confirmation; Future  - HIV 1/2 Antigen/Antibody Screen with Reflex to Confirmation    13. Hypoglycemia    - glucagon 1 mg/0.2 mL auto-injector; Inject 1 mg under the skin 1 time if needed (BG <65) for up to 1 dose.  Dispense: 0.2 mL; Refill: 1  - CBC and Auto Differential; Future  - Comprehensive Metabolic Panel; Future  - Lipid Panel; Future  - Hemoglobin A1C; Future  - TSH with reflex to Free T4 if abnormal; Future  - Vitamin D 25-Hydroxy,Total (for eval of Vitamin D levels); Future  - Prostate Specific Antigen, Screen; Future  - CBC and Auto Differential  - Comprehensive Metabolic Panel  - Lipid Panel  - Hemoglobin A1C  - TSH with reflex to Free T4 if abnormal  - Vitamin D 25-Hydroxy,Total (for eval of Vitamin D levels)  - Prostate Specific Antigen, Screen  - HIV 1/2 Antigen/Antibody Screen with Reflex to Confirmation; Future  - HIV 1/2 Antigen/Antibody Screen with Reflex to Confirmation    14. Erectile dysfunction due to arterial insufficiency  Natural history and course of erectile dysfunction was discussed with the patient.  Spoke with the patient about different causes of erectile dysfunction including physiologic and psychologic causes.   Spoke about the different possible physiologic causes including impaired blood flow, vascular issues, cardiac issues.  Spoke about different psychologic causes including low libido, anxiety, relationship dysfunction, and other psychogenic causes.  Spoke about conservative therapy including regular exercise, weight lifting and resistance training, eating a healthy diet rich in vegetables and fruits and low in processed carbohydrates and saturated fats.  Spoke about counseling for any possible psychological causes if applicable.  Spoke about the different treatment options for the patient including medications such as PDE 5 inhibitors like sildenafil and tadalafil.  Spoke about the cost benefit of each of these medications and expected side effects.  Spoke about further  treatment options should medications not work such as injectables and implants.   Spoke about the need for urology referral if necessary.  Patient made an informed decision about 1 of these treatments at this time.  - sildenafil (Viagra) 100 mg tablet; Take 1 tablet (100 mg) by mouth once daily as needed for erectile dysfunction.  Dispense: 30 tablet; Refill: 11  - CBC and Auto Differential; Future  - Comprehensive Metabolic Panel; Future  - Lipid Panel; Future  - Hemoglobin A1C; Future  - TSH with reflex to Free T4 if abnormal; Future  - Vitamin D 25-Hydroxy,Total (for eval of Vitamin D levels); Future  - Prostate Specific Antigen, Screen; Future  - CBC and Auto Differential  - Comprehensive Metabolic Panel  - Lipid Panel  - Hemoglobin A1C  - TSH with reflex to Free T4 if abnormal  - Vitamin D 25-Hydroxy,Total (for eval of Vitamin D levels)  - Prostate Specific Antigen, Screen  - HIV 1/2 Antigen/Antibody Screen with Reflex to Confirmation; Future  - HIV 1/2 Antigen/Antibody Screen with Reflex to Confirmation    15. Gastroesophageal reflux disease without esophagitis  Well-controlled continue medication  - omeprazole OTC (PriLOSEC OTC) 20 mg EC tablet; Take 1 tablet (20 mg) by mouth once daily in the morning. Take before meals. Do not crush, chew, or split.  Dispense: 90 tablet; Refill: 0  - CBC and Auto Differential; Future  - Comprehensive Metabolic Panel; Future  - Lipid Panel; Future  - Hemoglobin A1C; Future  - TSH with reflex to Free T4 if abnormal; Future  - Vitamin D 25-Hydroxy,Total (for eval of Vitamin D levels); Future  - Prostate Specific Antigen, Screen; Future  - CBC and Auto Differential  - Comprehensive Metabolic Panel  - Lipid Panel  - Hemoglobin A1C  - TSH with reflex to Free T4 if abnormal  - Vitamin D 25-Hydroxy,Total (for eval of Vitamin D levels)  - Prostate Specific Antigen, Screen  - HIV 1/2 Antigen/Antibody Screen with Reflex to Confirmation; Future  - HIV 1/2 Antigen/Antibody Screen with  Reflex to Confirmation    16. Myasthenia gravis with exacerbation (Multi)  Stable  - DULoxetine (Cymbalta) 30 mg DR capsule; Take 1 capsule (30 mg) by mouth once daily. Do not crush or chew.  Dispense: 90 capsule; Refill: 0  - CBC and Auto Differential; Future  - Comprehensive Metabolic Panel; Future  - Lipid Panel; Future  - Hemoglobin A1C; Future  - TSH with reflex to Free T4 if abnormal; Future  - Vitamin D 25-Hydroxy,Total (for eval of Vitamin D levels); Future  - Prostate Specific Antigen, Screen; Future  - CBC and Auto Differential  - Comprehensive Metabolic Panel  - Lipid Panel  - Hemoglobin A1C  - TSH with reflex to Free T4 if abnormal  - Vitamin D 25-Hydroxy,Total (for eval of Vitamin D levels)  - Prostate Specific Antigen, Screen  - HIV 1/2 Antigen/Antibody Screen with Reflex to Confirmation; Future  - HIV 1/2 Antigen/Antibody Screen with Reflex to Confirmation  - albuterol (Proventil HFA) 90 mcg/actuation inhaler; Inhale 2 puffs every 4 hours if needed for wheezing or shortness of breath.  Dispense: 6.7 g; Refill: 11    17. Vitamin D deficiency    - CBC and Auto Differential; Future  - Comprehensive Metabolic Panel; Future  - Lipid Panel; Future  - Hemoglobin A1C; Future  - TSH with reflex to Free T4 if abnormal; Future  - Vitamin D 25-Hydroxy,Total (for eval of Vitamin D levels); Future  - Prostate Specific Antigen, Screen; Future  - CBC and Auto Differential  - Comprehensive Metabolic Panel  - Lipid Panel  - Hemoglobin A1C  - TSH with reflex to Free T4 if abnormal  - Vitamin D 25-Hydroxy,Total (for eval of Vitamin D levels)  - Prostate Specific Antigen, Screen  - HIV 1/2 Antigen/Antibody Screen with Reflex to Confirmation; Future  - HIV 1/2 Antigen/Antibody Screen with Reflex to Confirmation

## 2025-04-13 LAB
25(OH)D3+25(OH)D2 SERPL-MCNC: 21 NG/ML (ref 30–100)
ALBUMIN SERPL-MCNC: 4.4 G/DL (ref 3.6–5.1)
ALP SERPL-CCNC: 45 U/L (ref 35–144)
ALT SERPL-CCNC: 35 U/L (ref 9–46)
ANION GAP SERPL CALCULATED.4IONS-SCNC: 11 MMOL/L (CALC) (ref 7–17)
AST SERPL-CCNC: 31 U/L (ref 10–35)
BASOPHILS # BLD AUTO: 18 CELLS/UL (ref 0–200)
BASOPHILS NFR BLD AUTO: 0.2 %
BILIRUB SERPL-MCNC: 0.7 MG/DL (ref 0.2–1.2)
BUN SERPL-MCNC: 30 MG/DL (ref 7–25)
CALCIUM SERPL-MCNC: 9.5 MG/DL (ref 8.6–10.3)
CHLORIDE SERPL-SCNC: 101 MMOL/L (ref 98–110)
CHOLEST SERPL-MCNC: 227 MG/DL
CHOLEST/HDLC SERPL: 6.1 (CALC)
CO2 SERPL-SCNC: 28 MMOL/L (ref 20–32)
CREAT SERPL-MCNC: 1.05 MG/DL (ref 0.7–1.3)
EGFRCR SERPLBLD CKD-EPI 2021: 82 ML/MIN/1.73M2
EOSINOPHIL # BLD AUTO: 18 CELLS/UL (ref 15–500)
EOSINOPHIL NFR BLD AUTO: 0.2 %
ERYTHROCYTE [DISTWIDTH] IN BLOOD BY AUTOMATED COUNT: 13.3 % (ref 11–15)
EST. AVERAGE GLUCOSE BLD GHB EST-MCNC: 157 MG/DL
EST. AVERAGE GLUCOSE BLD GHB EST-SCNC: 8.7 MMOL/L
GLUCOSE SERPL-MCNC: 120 MG/DL (ref 65–99)
HBA1C MFR BLD: 7.1 % OF TOTAL HGB
HCT VFR BLD AUTO: 43.7 % (ref 38.5–50)
HDLC SERPL-MCNC: 37 MG/DL
HGB BLD-MCNC: 15.3 G/DL (ref 13.2–17.1)
HIV 1+2 AB+HIV1 P24 AG SERPL QL IA: NORMAL
LDLC SERPL CALC-MCNC: 143 MG/DL (CALC)
LYMPHOCYTES # BLD AUTO: 845 CELLS/UL (ref 850–3900)
LYMPHOCYTES NFR BLD AUTO: 9.6 %
MCH RBC QN AUTO: 32.6 PG (ref 27–33)
MCHC RBC AUTO-ENTMCNC: 35 G/DL (ref 32–36)
MCV RBC AUTO: 93 FL (ref 80–100)
MEV IGG SER IA-ACNC: NORMAL
MONOCYTES # BLD AUTO: 387 CELLS/UL (ref 200–950)
MONOCYTES NFR BLD AUTO: 4.4 %
NEUTROPHILS # BLD AUTO: 7533 CELLS/UL (ref 1500–7800)
NEUTROPHILS NFR BLD AUTO: 85.6 %
NONHDLC SERPL-MCNC: 190 MG/DL (CALC)
PLATELET # BLD AUTO: 228 THOUSAND/UL (ref 140–400)
PMV BLD REES-ECKER: 10.6 FL (ref 7.5–12.5)
POTASSIUM SERPL-SCNC: 5.1 MMOL/L (ref 3.5–5.3)
PROT SERPL-MCNC: 6.9 G/DL (ref 6.1–8.1)
PSA SERPL-MCNC: 0.57 NG/ML
RBC # BLD AUTO: 4.7 MILLION/UL (ref 4.2–5.8)
SODIUM SERPL-SCNC: 140 MMOL/L (ref 135–146)
TRIGL SERPL-MCNC: 305 MG/DL
TSH SERPL-ACNC: 1.57 MIU/L (ref 0.4–4.5)
WBC # BLD AUTO: 8.8 THOUSAND/UL (ref 3.8–10.8)

## 2025-04-14 LAB
25(OH)D3+25(OH)D2 SERPL-MCNC: 21 NG/ML (ref 30–100)
ALBUMIN SERPL-MCNC: 4.4 G/DL (ref 3.6–5.1)
ALP SERPL-CCNC: 45 U/L (ref 35–144)
ALT SERPL-CCNC: 35 U/L (ref 9–46)
ANION GAP SERPL CALCULATED.4IONS-SCNC: 11 MMOL/L (CALC) (ref 7–17)
AST SERPL-CCNC: 31 U/L (ref 10–35)
BASOPHILS # BLD AUTO: 18 CELLS/UL (ref 0–200)
BASOPHILS NFR BLD AUTO: 0.2 %
BILIRUB SERPL-MCNC: 0.7 MG/DL (ref 0.2–1.2)
BUN SERPL-MCNC: 30 MG/DL (ref 7–25)
CALCIUM SERPL-MCNC: 9.5 MG/DL (ref 8.6–10.3)
CHLORIDE SERPL-SCNC: 101 MMOL/L (ref 98–110)
CHOLEST SERPL-MCNC: 227 MG/DL
CHOLEST/HDLC SERPL: 6.1 (CALC)
CO2 SERPL-SCNC: 28 MMOL/L (ref 20–32)
CREAT SERPL-MCNC: 1.05 MG/DL (ref 0.7–1.3)
EGFRCR SERPLBLD CKD-EPI 2021: 82 ML/MIN/1.73M2
EOSINOPHIL # BLD AUTO: 18 CELLS/UL (ref 15–500)
EOSINOPHIL NFR BLD AUTO: 0.2 %
ERYTHROCYTE [DISTWIDTH] IN BLOOD BY AUTOMATED COUNT: 13.3 % (ref 11–15)
EST. AVERAGE GLUCOSE BLD GHB EST-MCNC: 157 MG/DL
EST. AVERAGE GLUCOSE BLD GHB EST-SCNC: 8.7 MMOL/L
GLUCOSE SERPL-MCNC: 120 MG/DL (ref 65–99)
HBA1C MFR BLD: 7.1 % OF TOTAL HGB
HCT VFR BLD AUTO: 43.7 % (ref 38.5–50)
HDLC SERPL-MCNC: 37 MG/DL
HGB BLD-MCNC: 15.3 G/DL (ref 13.2–17.1)
HIV 1+2 AB+HIV1 P24 AG SERPL QL IA: NORMAL
LDLC SERPL CALC-MCNC: 143 MG/DL (CALC)
LYMPHOCYTES # BLD AUTO: 845 CELLS/UL (ref 850–3900)
LYMPHOCYTES NFR BLD AUTO: 9.6 %
MCH RBC QN AUTO: 32.6 PG (ref 27–33)
MCHC RBC AUTO-ENTMCNC: 35 G/DL (ref 32–36)
MCV RBC AUTO: 93 FL (ref 80–100)
MEV IGG SER IA-ACNC: 63.8 AU/ML
MONOCYTES # BLD AUTO: 387 CELLS/UL (ref 200–950)
MONOCYTES NFR BLD AUTO: 4.4 %
NEUTROPHILS # BLD AUTO: 7533 CELLS/UL (ref 1500–7800)
NEUTROPHILS NFR BLD AUTO: 85.6 %
NONHDLC SERPL-MCNC: 190 MG/DL (CALC)
PLATELET # BLD AUTO: 228 THOUSAND/UL (ref 140–400)
PMV BLD REES-ECKER: 10.6 FL (ref 7.5–12.5)
POTASSIUM SERPL-SCNC: 5.1 MMOL/L (ref 3.5–5.3)
PROT SERPL-MCNC: 6.9 G/DL (ref 6.1–8.1)
PSA SERPL-MCNC: 0.57 NG/ML
RBC # BLD AUTO: 4.7 MILLION/UL (ref 4.2–5.8)
SODIUM SERPL-SCNC: 140 MMOL/L (ref 135–146)
TRIGL SERPL-MCNC: 305 MG/DL
TSH SERPL-ACNC: 1.57 MIU/L (ref 0.4–4.5)
WBC # BLD AUTO: 8.8 THOUSAND/UL (ref 3.8–10.8)

## 2025-04-15 ENCOUNTER — PATIENT MESSAGE (OUTPATIENT)
Dept: PRIMARY CARE | Facility: CLINIC | Age: 60
End: 2025-04-15
Payer: COMMERCIAL

## 2025-04-18 ENCOUNTER — APPOINTMENT (OUTPATIENT)
Dept: PRIMARY CARE | Facility: CLINIC | Age: 60
End: 2025-04-18
Payer: COMMERCIAL

## 2025-04-18 ENCOUNTER — TELEMEDICINE (OUTPATIENT)
Dept: PHARMACY | Facility: HOSPITAL | Age: 60
End: 2025-04-18
Payer: COMMERCIAL

## 2025-04-18 DIAGNOSIS — E11.9 TYPE 2 DIABETES MELLITUS WITHOUT COMPLICATION, WITHOUT LONG-TERM CURRENT USE OF INSULIN: Primary | ICD-10-CM

## 2025-04-18 DIAGNOSIS — E11.9 ALTERED METABOLISM DUE TO DIABETES (MULTI): ICD-10-CM

## 2025-04-18 RX ORDER — BLOOD-GLUCOSE SENSOR
EACH MISCELLANEOUS
Qty: 2 EACH | Refills: 11 | Status: SHIPPED | OUTPATIENT
Start: 2025-04-18

## 2025-04-18 RX ORDER — TIRZEPATIDE 2.5 MG/.5ML
2.5 INJECTION, SOLUTION SUBCUTANEOUS WEEKLY
Qty: 2 ML | Refills: 1 | Status: SHIPPED | OUTPATIENT
Start: 2025-04-18

## 2025-04-18 NOTE — PROGRESS NOTES
Patient is sent at the request of Gerard Herndon DO for my opinion regarding Type 2 diabetes.  My final recommendations will be communicated back to the requesting provider by way of shared medical record.    Subjective     HPI      Allergies[1]    Patient is using: glucometer  The patient is currently checking the blood glucose 1 times per day.  -most recent fasting sugar 107  Patient is interested in Norma 3 plus    Hypoglycemia frequency: 0/week  Hypoglycemia awareness: Yes   Discussed signs and symptoms of hypoglycemia and how to treat  Patient has glucagon but has not needed to use it    Adverse Effects:   Patient reports no adverse effects     Objective     There were no vitals taken for this visit.    Diabetes Pharmacotherapy:  Jardiance 10 mg once daily  Humalog 25-30 units subQ three times daily before meals  -cartridges were sent, patient will request pens for next fill   Insulin  units subQ twice daily before meals  Metformin 1000 mg twice daily      SECONDARY PREVENTION  - Statin? No - did not tolerate  - ACE-I/ARB? Yes  - Aspirin? No      Lab Review  Lab Results   Component Value Date    BILITOT 0.7 04/11/2025    CALCIUM 9.5 04/11/2025    CO2 28 04/11/2025     04/11/2025    CREATININE 1.05 04/11/2025    GLUCOSE 120 (H) 04/11/2025    ALKPHOS 45 04/11/2025    K 5.1 04/11/2025    PROT 6.9 04/11/2025     04/11/2025    AST 31 04/11/2025    ALT 35 04/11/2025    BUN 30 (H) 04/11/2025    ANIONGAP 11 04/11/2025    ALBUMIN 4.4 04/11/2025     Lab Results   Component Value Date    TRIG 305 (H) 04/11/2025    CHOL 227 (H) 04/11/2025    LDLCALC 143 (H) 04/11/2025    HDL 37 (L) 04/11/2025     Lab Results   Component Value Date    HGBA1C 7.1 (H) 04/11/2025     The 10-year ASCVD risk score (Ashia PALMER, et al., 2019) is: 25.9%    Values used to calculate the score:      Age: 59 years      Sex: Male      Is Non- : No      Diabetic: Yes      Tobacco smoker: No      Systolic Blood  Pressure: 136 mmHg      Is BP treated: Yes      HDL Cholesterol: 37 mg/dL      Total Cholesterol: 227 mg/dL        Assessment/Plan   Problem List Items Addressed This Visit    None  Visit Diagnoses         Altered metabolism due to diabetes (Multi)                Patients diabetes is stable with most recent A1c of 7.1% (Goal < 7%).   Initiate  Mounjaro 2.5 mg subQ once weekly  Covered at pharmacy for $25/month with copay card (gave instruction on how to download)  Norma 3 plus sensors to monitor blood sugars  Continue  Jardiance 10 mg once daily  Humalog 25-30 units subQ three times daily before meals  Insulin  units subQ twice daily before meals  Metformin 1000 mg twice daily    Compliance at present is estimated to be good. Efforts to improve compliance (if necessary) will be directed at regular blood sugar monitorin times daily with norma 3  Education Provided to Patient:  Mounjaro Education:   - Counseled patient on MOA, expectations, side effects, duration of therapy, contraindications, administration, and monitoring parameters  - Answered all patient questions and concerns  - Provided detailed dosing and administration counseling to ensure proper technique.   - Reviewed Mounjaro titration schedule, starting with 2.5 mg once weekly to a goal of 15 mg once weekly if tolerated  - Counseled patient on the benefits of GLP-1ra glycemic control and weight loss  - Advised patient that they may experience improved satiety after meals and portion sizes of meals may be reduced as doses of Mounjaro increase.    Jardiance Education:   - Counseled patient on Jardiance MOA, expectations, side effects, duration of therapy, administration, and monitoring parameters.  - Reviewed the benefits of SGLT-2i therapy, such as glycemic control and kidney and CV protection.  - Advised patient to practice proper  hygiene to reduce risk of UTIs or yeast infections.  - Advised patient to maintain adequate fluid intake to  remain hydrated while on SGLT2i therapy.  - Answered all patient questions and concerns.    Patient received the following instructions for Norma personal start:     Patient downloaded Norma 3 hemant. Patient will benefit from insight using Norma CGM and learning which meals / snacks cause high sugars. We will also be able to make necessary adjustments to the patient's insulin / medication regimen based on CGM data at next follow up. Patient will give me a call if he has any questions when starting sensor.      Follow-up: I recommend diabetes care be 1 month.  PCP Follow-Up: 3 months    Kimberli Rasmussen, PharmD, BCPS    Continue all meds under the continuation of care with the referring provider and clinical pharmacy team.         [1]   Allergies  Allergen Reactions    Bactrim [Sulfamethoxazole-Trimethoprim] Fever

## 2025-05-21 ENCOUNTER — APPOINTMENT (OUTPATIENT)
Dept: PHARMACY | Facility: HOSPITAL | Age: 60
End: 2025-05-21
Payer: COMMERCIAL

## 2025-05-21 DIAGNOSIS — E11.9 TYPE 2 DIABETES MELLITUS WITHOUT COMPLICATION, WITHOUT LONG-TERM CURRENT USE OF INSULIN: Primary | ICD-10-CM

## 2025-05-21 RX ORDER — TIRZEPATIDE 5 MG/.5ML
5 INJECTION, SOLUTION SUBCUTANEOUS WEEKLY
Qty: 2 ML | Refills: 0 | Status: SHIPPED | OUTPATIENT
Start: 2025-05-21

## 2025-05-21 NOTE — PROGRESS NOTES
Patient is sent at the request of Gerard Herndon DO to follow up after starting Mounjaro for T2DM.  My final recommendations will be communicated back to the requesting provider by way of shared medical record.    Subjective     HPI      Allergies[1]    Patient is using: glucometer  The patient is currently checking the blood glucose 1 times per day.  -most recent fasting sugar 107  Has not yet started using kenny 3, states he will get this done soon    Hypoglycemia frequency: 0/week  Hypoglycemia awareness: Yes   Discussed signs and symptoms of hypoglycemia and how to treat  Patient has glucagon but has not needed to use it    Adverse Effects:   Patient reports no adverse effects     Objective     There were no vitals taken for this visit.    Diabetes Pharmacotherapy:  Jardiance 10 mg once daily  Humalog 25-30 units subQ three times daily before meals  -cartridges were sent, patient will request pens for next fill   Insulin  units subQ twice daily before meals  Metformin 1000 mg twice daily  Mounjaro 2.5 mg once weekly  -takes on fridays      SECONDARY PREVENTION  - Statin? No - did not tolerate  - ACE-I/ARB? Yes  - Aspirin? No      Lab Review  Lab Results   Component Value Date    BILITOT 0.7 04/11/2025    CALCIUM 9.5 04/11/2025    CO2 28 04/11/2025     04/11/2025    CREATININE 1.05 04/11/2025    GLUCOSE 120 (H) 04/11/2025    ALKPHOS 45 04/11/2025    K 5.1 04/11/2025    PROT 6.9 04/11/2025     04/11/2025    AST 31 04/11/2025    ALT 35 04/11/2025    BUN 30 (H) 04/11/2025    ANIONGAP 11 04/11/2025    ALBUMIN 4.4 04/11/2025     Lab Results   Component Value Date    TRIG 305 (H) 04/11/2025    CHOL 227 (H) 04/11/2025    LDLCALC 143 (H) 04/11/2025    HDL 37 (L) 04/11/2025     Lab Results   Component Value Date    HGBA1C 7.1 (H) 04/11/2025     The 10-year ASCVD risk score (Ashia PALMER, et al., 2019) is: 25.9%    Values used to calculate the score:      Age: 59 years      Sex: Male      Is Non-  : No      Diabetic: Yes      Tobacco smoker: No      Systolic Blood Pressure: 136 mmHg      Is BP treated: Yes      HDL Cholesterol: 37 mg/dL      Total Cholesterol: 227 mg/dL        Assessment/Plan   Problem List Items Addressed This Visit       Type 2 diabetes mellitus without complications       Patients diabetes is stable with most recent A1c of 7.1% (Goal < 7%). Is doing well on Mounjaro 2.5 mg, states he has not noticed any side effects or any changes in appetite.  Increase  Mounjaro 5 mg subQ once weekly  Initiate  Norma 3 plus sensors to monitor blood sugars  Continue  Jardiance 10 mg once daily  Humalog 25-30 units subQ three times daily before meals  Insulin  units subQ twice daily before meals  Metformin 1000 mg twice daily    Compliance at present is estimated to be good. Efforts to improve compliance (if necessary) will be directed at regular blood sugar monitorin times daily with norma 3.   Education Provided to Patient:  Mounjaro Education:   - Counseled patient on MOA, expectations, side effects, duration of therapy, contraindications, administration, and monitoring parameters  - Answered all patient questions and concerns  - Reviewed Mounjaro titration schedule, starting with 2.5 mg once weekly to a goal of 15 mg once weekly if tolerated  - Counseled patient on the benefits of GLP-1ra glycemic control and weight loss  - Advised patient that they may experience improved satiety after meals and portion sizes of meals may be reduced as doses of Mounjaro increase.    Patient received the following instructions for Norma personal start:     Patient downloaded [employer] 3 hemant. Patient will benefit from insight using Norma CGM and learning which meals / snacks cause high sugars. We will also be able to make necessary adjustments to the patient's insulin / medication regimen based on CGM data at next follow up. Patient will give me a call if he has any questions when starting  sensor.      Follow-up: I recommend diabetes care be 1 month.  PCP Follow-Up: 2 months    Kimberli Rasmussen, PharmD, BCPS    Continue all meds under the continuation of care with the referring provider and clinical pharmacy team.         [1]   Allergies  Allergen Reactions    Bactrim [Sulfamethoxazole-Trimethoprim] Fever

## 2025-06-16 ENCOUNTER — PATIENT MESSAGE (OUTPATIENT)
Dept: PRIMARY CARE | Facility: CLINIC | Age: 60
End: 2025-06-16
Payer: COMMERCIAL

## 2025-06-16 DIAGNOSIS — E11.9 ALTERED METABOLISM DUE TO DIABETES (MULTI): ICD-10-CM

## 2025-06-16 DIAGNOSIS — K21.9 GASTROESOPHAGEAL REFLUX DISEASE WITHOUT ESOPHAGITIS: ICD-10-CM

## 2025-06-17 RX ORDER — NEEDLES, SAFETY 22GX1 1/2"
1 NEEDLE, DISPOSABLE MISCELLANEOUS 2 TIMES DAILY
Qty: 200 EACH | Refills: 3 | Status: SHIPPED | OUTPATIENT
Start: 2025-06-17 | End: 2026-06-17

## 2025-06-17 RX ORDER — OMEPRAZOLE 20 MG/1
20 TABLET, DELAYED RELEASE ORAL
Qty: 90 TABLET | Refills: 0 | Status: SHIPPED | OUTPATIENT
Start: 2025-06-17 | End: 2025-09-15

## 2025-06-18 ENCOUNTER — APPOINTMENT (OUTPATIENT)
Dept: PHARMACY | Facility: HOSPITAL | Age: 60
End: 2025-06-18
Payer: COMMERCIAL

## 2025-06-18 DIAGNOSIS — E11.9 TYPE 2 DIABETES MELLITUS WITHOUT COMPLICATION, WITHOUT LONG-TERM CURRENT USE OF INSULIN: ICD-10-CM

## 2025-06-18 RX ORDER — TIRZEPATIDE 7.5 MG/.5ML
7.5 INJECTION, SOLUTION SUBCUTANEOUS WEEKLY
Qty: 2 ML | Refills: 2 | Status: SHIPPED | OUTPATIENT
Start: 2025-06-18

## 2025-06-18 NOTE — PROGRESS NOTES
Patient is sent at the request of Gerard Herndon DO to follow up after starting Mounjaro for T2DM.  My final recommendations will be communicated back to the requesting provider by way of shared medical record.    Subjective     HPI      Allergies[1]    Patient is using: continuous glucose monitor  Norma 3  Uses phone  Connected to pratice  Above target 5%  In target 93%  Below target 2%  GMI 6.2%    Hypoglycemia frequency: 2/week  Hypoglycemia awareness: Yes   Discussed signs and symptoms of hypoglycemia and how to treat  Patient has glucagon but has not needed to use it    Adverse Effects:   Some upset stomach closer to Mounjaro injection date, has gotten better    Objective     There were no vitals taken for this visit.    Diabetes Pharmacotherapy:  Jardiance 10 mg once daily  Humalog 25-30 units subQ three times daily before meals  -cartridges were sent, patient will request pens for next fill   Insulin NPH 60 units subQ twice daily before meals  Metformin 1000 mg twice daily  Mounjaro 5 mg once weekly  -takes on fridays      SECONDARY PREVENTION  - Statin? No - did not tolerate  - ACE-I/ARB? Yes  - Aspirin? No      Lab Review  Lab Results   Component Value Date    BILITOT 0.7 04/11/2025    CALCIUM 9.5 04/11/2025    CO2 28 04/11/2025     04/11/2025    CREATININE 1.05 04/11/2025    GLUCOSE 120 (H) 04/11/2025    ALKPHOS 45 04/11/2025    K 5.1 04/11/2025    PROT 6.9 04/11/2025     04/11/2025    AST 31 04/11/2025    ALT 35 04/11/2025    BUN 30 (H) 04/11/2025    ANIONGAP 11 04/11/2025    ALBUMIN 4.4 04/11/2025     Lab Results   Component Value Date    TRIG 305 (H) 04/11/2025    CHOL 227 (H) 04/11/2025    LDLCALC 143 (H) 04/11/2025    HDL 37 (L) 04/11/2025     Lab Results   Component Value Date    HGBA1C 7.1 (H) 04/11/2025     The 10-year ASCVD risk score (Ashia PALMER, et al., 2019) is: 25.9%    Values used to calculate the score:      Age: 59 years      Sex: Male      Is Non- : No       Diabetic: Yes      Tobacco smoker: No      Systolic Blood Pressure: 136 mmHg      Is BP treated: Yes      HDL Cholesterol: 37 mg/dL      Total Cholesterol: 227 mg/dL        Assessment/Plan   Problem List Items Addressed This Visit    None        Patients diabetes is improved with most recent GMI 6.2% (Goal < 7%). Is doing well on Mounjaro 5 mg.  Increase  Mounjaro 7.5 mg subQ once weekly  Continue  Jardiance 10 mg once daily  Humalog 25-30 units subQ three times daily before meals  Insulin NPH 60 units subQ twice daily before meals  Continue to decrease dose if you have lows  Decrease by 2 units every 3 days for every low blood sugar  Metformin 1000 mg twice daily    Compliance at present is estimated to be good.   Education Provided to Patient:  Mounjaro Education:   - Counseled patient on MOA, expectations, side effects, duration of therapy, contraindications, administration, and monitoring parameters  - Answered all patient questions and concerns  - Reviewed Mounjaro titration schedule, starting with 2.5 mg once weekly to a goal of 15 mg once weekly if tolerated  - Counseled patient on the benefits of GLP-1ra glycemic control and weight loss  - Advised patient that they may experience improved satiety after meals and portion sizes of meals may be reduced as doses of Mounjaro increase.    Discussed that once we get Mounjaro to a maintenance dose (can go up to 15 mg) we could switch him over to once a day long acting insulin instead of BID NPH.    Follow-up: I recommend diabetes care be 2 months  PCP Follow-Up: 1 month    Kimberli Rasmussen, PharmD, BCPS    Continue all meds under the continuation of care with the referring provider and clinical pharmacy team.         [1]   Allergies  Allergen Reactions    Bactrim [Sulfamethoxazole-Trimethoprim] Fever

## 2025-06-23 ENCOUNTER — TELEPHONE (OUTPATIENT)
Dept: PRIMARY CARE | Facility: CLINIC | Age: 60
End: 2025-06-23
Payer: COMMERCIAL

## 2025-06-23 DIAGNOSIS — K21.9 GASTROESOPHAGEAL REFLUX DISEASE WITHOUT ESOPHAGITIS: Primary | ICD-10-CM

## 2025-06-23 RX ORDER — OMEPRAZOLE 20 MG/1
20 CAPSULE, DELAYED RELEASE ORAL DAILY
Qty: 90 CAPSULE | Refills: 3 | Status: SHIPPED | OUTPATIENT
Start: 2025-06-23 | End: 2026-06-23

## 2025-06-23 NOTE — TELEPHONE ENCOUNTER
Pharmacy called stating tablets are not covered by insurance, but the the capsules are. >Requesting new Rx for capsules   omeprazole OTC (PriLOSEC OTC) 20 mg EC tablet

## 2025-07-11 ENCOUNTER — OFFICE VISIT (OUTPATIENT)
Dept: PRIMARY CARE | Facility: CLINIC | Age: 60
End: 2025-07-11
Payer: COMMERCIAL

## 2025-07-11 VITALS
WEIGHT: 271 LBS | SYSTOLIC BLOOD PRESSURE: 124 MMHG | BODY MASS INDEX: 33 KG/M2 | HEIGHT: 76 IN | HEART RATE: 100 BPM | DIASTOLIC BLOOD PRESSURE: 68 MMHG | OXYGEN SATURATION: 98 %

## 2025-07-11 DIAGNOSIS — E11.9 TYPE 2 DIABETES MELLITUS WITHOUT COMPLICATION, UNSPECIFIED WHETHER LONG TERM INSULIN USE: ICD-10-CM

## 2025-07-11 DIAGNOSIS — E11.9 ALTERED METABOLISM DUE TO DIABETES (MULTI): ICD-10-CM

## 2025-07-11 DIAGNOSIS — I10 PRIMARY HYPERTENSION: ICD-10-CM

## 2025-07-11 DIAGNOSIS — E03.9 HYPOTHYROIDISM (ACQUIRED): ICD-10-CM

## 2025-07-11 DIAGNOSIS — E78.2 MIXED HYPERLIPIDEMIA: Primary | ICD-10-CM

## 2025-07-11 DIAGNOSIS — E11.9 TYPE 2 DIABETES MELLITUS WITHOUT COMPLICATION, WITHOUT LONG-TERM CURRENT USE OF INSULIN: Primary | ICD-10-CM

## 2025-07-11 DIAGNOSIS — N40.0 BENIGN PROSTATIC HYPERPLASIA, UNSPECIFIED WHETHER LOWER URINARY TRACT SYMPTOMS PRESENT: ICD-10-CM

## 2025-07-11 PROCEDURE — 3008F BODY MASS INDEX DOCD: CPT | Performed by: FAMILY MEDICINE

## 2025-07-11 PROCEDURE — 3078F DIAST BP <80 MM HG: CPT | Performed by: FAMILY MEDICINE

## 2025-07-11 PROCEDURE — 3074F SYST BP LT 130 MM HG: CPT | Performed by: FAMILY MEDICINE

## 2025-07-11 PROCEDURE — 1036F TOBACCO NON-USER: CPT | Performed by: FAMILY MEDICINE

## 2025-07-11 PROCEDURE — 99214 OFFICE O/P EST MOD 30 MIN: CPT | Performed by: FAMILY MEDICINE

## 2025-07-11 RX ORDER — PEN NEEDLE, DIABETIC 30 GX3/16"
1 NEEDLE, DISPOSABLE MISCELLANEOUS 3 TIMES DAILY
Qty: 300 EACH | Refills: 3 | Status: SHIPPED | OUTPATIENT
Start: 2025-07-11

## 2025-07-11 RX ORDER — METFORMIN HYDROCHLORIDE 1000 MG/1
1000 TABLET ORAL
Qty: 180 TABLET | Refills: 0 | Status: SHIPPED | OUTPATIENT
Start: 2025-07-11

## 2025-07-11 RX ORDER — AMLODIPINE BESYLATE 10 MG/1
10 TABLET ORAL DAILY
Qty: 90 TABLET | Refills: 3 | Status: SHIPPED | OUTPATIENT
Start: 2025-07-11 | End: 2026-07-11

## 2025-07-11 RX ORDER — TERAZOSIN 1 MG/1
1 CAPSULE ORAL NIGHTLY
Qty: 90 CAPSULE | Refills: 3 | Status: SHIPPED | OUTPATIENT
Start: 2025-07-11 | End: 2026-07-11

## 2025-07-11 RX ORDER — INSULIN LISPRO 100 [IU]/ML
INJECTION, SOLUTION INTRAVENOUS; SUBCUTANEOUS
Qty: 30 ML | Refills: 11 | Status: SHIPPED | OUTPATIENT
Start: 2025-07-11

## 2025-07-11 RX ORDER — LEVOTHYROXINE SODIUM 175 UG/1
175 TABLET ORAL DAILY
Qty: 90 TABLET | Refills: 3 | Status: SHIPPED | OUTPATIENT
Start: 2025-07-11 | End: 2026-07-11

## 2025-07-11 RX ORDER — LISINOPRIL AND HYDROCHLOROTHIAZIDE 12.5; 2 MG/1; MG/1
1 TABLET ORAL 2 TIMES DAILY
Qty: 180 TABLET | Refills: 3 | Status: SHIPPED | OUTPATIENT
Start: 2025-07-11 | End: 2026-07-11

## 2025-07-11 RX ORDER — ATORVASTATIN CALCIUM 20 MG/1
20 TABLET, FILM COATED ORAL DAILY
Qty: 90 TABLET | Refills: 3 | Status: SHIPPED | OUTPATIENT
Start: 2025-07-11 | End: 2026-07-11

## 2025-07-11 RX ORDER — NEEDLES, SAFETY 22GX1 1/2"
1 NEEDLE, DISPOSABLE MISCELLANEOUS 2 TIMES DAILY
Qty: 200 EACH | Refills: 3 | Status: SHIPPED | OUTPATIENT
Start: 2025-07-11 | End: 2026-07-11

## 2025-07-11 ASSESSMENT — PAIN SCALES - GENERAL: PAINLEVEL_OUTOF10: 6

## 2025-07-11 NOTE — PROGRESS NOTES
59-year-old here for follow-up      1. Mixed hyperlipidemia   Recently started statin without deleterious side effects.  Needs LDL rechecked.   2. Primary hypertension   Blood pressure 1/24/1968 on current therapy   3. Altered metabolism due to diabetes (Multi)   Has not working with pharmacy uptitrating Mounjaro and slowly decreasing insulin requirements.  Has lost around 25 pound so far since starting Mounjaro.   4. Hypothyroidism (acquired)   TSH at goal on levothyroxine follows with endocrinology regularly for myasthenia gravis   5. Type 2 diabetes mellitus without complication, unspecified whether long term insulin use    6. Benign prostatic hyperplasia, unspecified whether lower urinary tract symptoms present   Stable     All pertinent positive symptoms are included in history of present illness.    All other systems have been reviewed and are negative and noncontributory to this patient's current ailments.      CONSTITUTIONAL - INAD. Not ill appearing.  SKIN - No lesions or rashes visualized. No jaundice visualized.  HEENT- Atraumatic, normocephalic, no scleral icterus, external nares are not erythematous and without drainage, no neck masses visualized, oropharynx visualized and is without erythema or exudate  RESP - respiration not labored   CARDIAC - no grade 6 systolic murmurs auscultated  ABDOMEN - nondistended.  NEURO- CNs II-XII grossly intact          1. Primary hypertension  Well-controlled  - amLODIPine (Norvasc) 10 mg tablet; Take 1 tablet (10 mg) by mouth once daily. For 90 days  Dispense: 90 tablet; Refill: 3    2. Altered metabolism due to diabetes (Multi)  Improving control continue Jardiance, insulin and uptitrating Mounjaro.  Continue following with pharmacy for dosage adjustments.  - empagliflozin (Jardiance) 10 mg tablet; Take 1 tablet (10 mg) by mouth once daily with breakfast.  Dispense: 90 tablet; Refill: 3  - insulin syringe,safety needle (BD SafetyGlide Insulin Syringe) 0.3 mL 31 gauge  "x 5/16\" syringe; 1 Syringe 2 times a day. Use to inject 2 times daily as directed.  Dispense: 200 each; Refill: 3  - lisinopriL-hydrochlorothiazide 20-12.5 mg tablet; Take 1 tablet by mouth 2 times a day.  Dispense: 180 tablet; Refill: 3  - Hemoglobin A1c; Future  - Hemoglobin A1c    3. Hypothyroidism (acquired)  Well-controlled  - levothyroxine (Synthroid, Levoxyl) 175 mcg tablet; Take 1 tablet (175 mcg) by mouth early in the morning.. Take on an empty stomach at the same time each day, either 30 to 60 minutes prior to breakfast  Dispense: 90 tablet; Refill: 3  - Tsh With Reflex To Free T4 If Abnormal; Future  - Tsh With Reflex To Free T4 If Abnormal    4. Type 2 diabetes mellitus without complication, unspecified whether long term insulin use    - metFORMIN (Glucophage) 1,000 mg tablet; Take 1 tablet (1,000 mg) by mouth 2 times daily (morning and late afternoon).  Dispense: 180 tablet; Refill: 0    5. Benign prostatic hyperplasia, unspecified whether lower urinary tract symptoms present  Well-controlled  - terazosin (Hytrin) 1 mg capsule; Take 1 capsule (1 mg) by mouth once daily at bedtime.  Dispense: 90 capsule; Refill: 3    6. Mixed hyperlipidemia (Primary)  Recheck lipids continue atorvastatin  - Lipid panel; Future  - Comprehensive metabolic panel; Future  - atorvastatin (Lipitor) 20 mg tablet; Take 1 tablet (20 mg) by mouth once daily.  Dispense: 90 tablet; Refill: 3  - Lipid panel  - Comprehensive metabolic panel      "

## 2025-07-11 NOTE — PROGRESS NOTES
Can call and inform patient I spoke to the pharmacist and that he can utilize his standard insulin needles with the cartridges he has just like he does a typical insulin vial.  He does not need a special injector.    We will switch to the pens when he is done with the cartridges

## 2025-07-12 LAB
ALBUMIN SERPL-MCNC: 4.4 G/DL (ref 3.6–5.1)
ALP SERPL-CCNC: 47 U/L (ref 35–144)
ALT SERPL-CCNC: 33 U/L (ref 9–46)
ANION GAP SERPL CALCULATED.4IONS-SCNC: 14 MMOL/L (CALC) (ref 7–17)
AST SERPL-CCNC: 32 U/L (ref 10–35)
BILIRUB SERPL-MCNC: 0.8 MG/DL (ref 0.2–1.2)
BUN SERPL-MCNC: 29 MG/DL (ref 7–25)
CALCIUM SERPL-MCNC: 9.9 MG/DL (ref 8.6–10.3)
CHLORIDE SERPL-SCNC: 100 MMOL/L (ref 98–110)
CHOLEST SERPL-MCNC: 171 MG/DL
CHOLEST/HDLC SERPL: 4.2 (CALC)
CO2 SERPL-SCNC: 26 MMOL/L (ref 20–32)
CREAT SERPL-MCNC: 1.09 MG/DL (ref 0.7–1.3)
EGFRCR SERPLBLD CKD-EPI 2021: 78 ML/MIN/1.73M2
EST. AVERAGE GLUCOSE BLD GHB EST-MCNC: 131 MG/DL
EST. AVERAGE GLUCOSE BLD GHB EST-SCNC: 7.3 MMOL/L
GLUCOSE SERPL-MCNC: 111 MG/DL (ref 65–99)
HBA1C MFR BLD: 6.2 %
HDLC SERPL-MCNC: 41 MG/DL
LDLC SERPL CALC-MCNC: 99 MG/DL (CALC)
NONHDLC SERPL-MCNC: 130 MG/DL (CALC)
POTASSIUM SERPL-SCNC: 4.7 MMOL/L (ref 3.5–5.3)
PROT SERPL-MCNC: 7 G/DL (ref 6.1–8.1)
SODIUM SERPL-SCNC: 140 MMOL/L (ref 135–146)
T4 FREE SERPL-MCNC: 1.7 NG/DL (ref 0.8–1.8)
TRIGL SERPL-MCNC: 184 MG/DL
TSH SERPL-ACNC: 0.21 MIU/L (ref 0.4–4.5)

## 2025-07-21 DIAGNOSIS — G70.01 MYASTHENIA GRAVIS WITH EXACERBATION (MULTI): ICD-10-CM

## 2025-07-21 RX ORDER — DULOXETIN HYDROCHLORIDE 30 MG/1
30 CAPSULE, DELAYED RELEASE ORAL DAILY
Qty: 90 CAPSULE | Refills: 0 | Status: SHIPPED | OUTPATIENT
Start: 2025-07-21 | End: 2025-10-19

## 2025-07-21 NOTE — TELEPHONE ENCOUNTER
Lov 7/11/25    Refill request for Cymbalta to [facility] @ [address], [address], [place] [address]

## 2025-08-13 ENCOUNTER — APPOINTMENT (OUTPATIENT)
Dept: PHARMACY | Facility: HOSPITAL | Age: 60
End: 2025-08-13
Payer: COMMERCIAL

## 2025-08-13 DIAGNOSIS — E11.9 TYPE 2 DIABETES MELLITUS WITHOUT COMPLICATION, WITHOUT LONG-TERM CURRENT USE OF INSULIN: ICD-10-CM

## 2025-08-13 RX ORDER — TIRZEPATIDE 10 MG/.5ML
10 INJECTION, SOLUTION SUBCUTANEOUS WEEKLY
Qty: 2 ML | Refills: 0 | Status: SHIPPED | OUTPATIENT
Start: 2025-08-13

## 2025-09-12 ENCOUNTER — APPOINTMENT (OUTPATIENT)
Dept: PHARMACY | Facility: HOSPITAL | Age: 60
End: 2025-09-12
Payer: COMMERCIAL

## 2025-10-15 ENCOUNTER — APPOINTMENT (OUTPATIENT)
Dept: PRIMARY CARE | Facility: CLINIC | Age: 60
End: 2025-10-15
Payer: COMMERCIAL